# Patient Record
Sex: FEMALE | Race: WHITE | NOT HISPANIC OR LATINO | Employment: UNEMPLOYED | URBAN - METROPOLITAN AREA
[De-identification: names, ages, dates, MRNs, and addresses within clinical notes are randomized per-mention and may not be internally consistent; named-entity substitution may affect disease eponyms.]

---

## 2017-01-03 RX ORDER — HYDROXYZINE HYDROCHLORIDE 25 MG/1
25 TABLET, FILM COATED ORAL EVERY 6 HOURS PRN
Qty: 30 TABLET | Refills: 0 | Status: SHIPPED | OUTPATIENT
Start: 2017-01-03 | End: 2017-06-09 | Stop reason: SDUPTHER

## 2017-03-27 ENCOUNTER — TELEPHONE (OUTPATIENT)
Dept: INTERNAL MEDICINE | Facility: CLINIC | Age: 37
End: 2017-03-27

## 2017-03-27 ENCOUNTER — OFFICE VISIT (OUTPATIENT)
Dept: INTERNAL MEDICINE | Facility: CLINIC | Age: 37
End: 2017-03-27

## 2017-03-27 VITALS
TEMPERATURE: 99.8 F | WEIGHT: 157 LBS | HEART RATE: 88 BPM | SYSTOLIC BLOOD PRESSURE: 110 MMHG | BODY MASS INDEX: 26.95 KG/M2 | DIASTOLIC BLOOD PRESSURE: 64 MMHG | RESPIRATION RATE: 20 BRPM

## 2017-03-27 DIAGNOSIS — L02.415 ABSCESS OF LEG, RIGHT: Primary | ICD-10-CM

## 2017-03-27 PROCEDURE — 99213 OFFICE O/P EST LOW 20 MIN: CPT | Performed by: INTERNAL MEDICINE

## 2017-03-27 RX ORDER — VITAMIN A ACETATE, .BETA.-CAROTENE, ASCORBIC ACID, CHOLECALCIFEROL, .ALPHA.-TOCOPHEROL ACETATE, DL-, THIAMINE MONONITRATE, RIBOFLAVIN, NIACINAMIDE, PYRIDOXINE HYDROCHLORIDE, FOLIC ACID, CYANOCOBALAMIN, CALCIUM CARBONATE, FERROUS FUMARATE, ZINC OXIDE, AND CUPRIC OXIDE 2000; 2000; 120; 400; 22; 1.84; 3; 20; 10; 1; 12; 200; 27; 25; 2 [IU]/1; [IU]/1; MG/1; [IU]/1; MG/1; MG/1; MG/1; MG/1; MG/1; MG/1; UG/1; MG/1; MG/1; MG/1; MG/1
TABLET ORAL
COMMUNITY
Start: 2017-03-21 | End: 2018-06-06

## 2017-03-27 RX ORDER — AMOXICILLIN AND CLAVULANATE POTASSIUM 875; 125 MG/1; MG/1
1 TABLET, FILM COATED ORAL 2 TIMES DAILY
Qty: 20 TABLET | Refills: 0 | Status: SHIPPED | OUTPATIENT
Start: 2017-03-27 | End: 2017-04-06

## 2017-03-27 RX ORDER — SERTRALINE HYDROCHLORIDE 100 MG/1
100 TABLET, FILM COATED ORAL DAILY
COMMUNITY
End: 2018-05-28

## 2017-03-27 NOTE — TELEPHONE ENCOUNTER
Spoke with Lisa and told her to come at 4pm today for an appt.  Verb understanding given       Judy please schedule

## 2017-03-27 NOTE — PROGRESS NOTES
Subjective       Lisa Grover is a 36 y.o. female.     Chief Complaint   Patient presents with   • Insect Bite     Rt thigh        History obtained from the patient.      Insect Bite   This is a new problem. Episode onset: 2-3 days ago. The problem has been gradually worsening. Associated symptoms include arthralgias (shoulders), fatigue, nausea and neck pain. Pertinent negatives include no abdominal pain, chest pain, chills, congestion, coughing, fever, joint swelling, myalgias, rash, sore throat, swollen glands or vomiting. Treatments tried: alcohol and tetri oil, Neosporin, and warm compresses.      She is not sure if she was bit by an insect but she sits outside and smokes.  It is not itchy.  She states it drained yellow discharge 2 days ago.  The following portions of the patient's history were reviewed and updated as appropriate: allergies, current medications, past family history, past medical history, past social history, past surgical history and problem list.      Review of Systems   Constitutional: Positive for fatigue. Negative for chills, fever and unexpected weight change.   HENT: Negative for congestion and sore throat.    Respiratory: Negative for cough and shortness of breath.    Cardiovascular: Negative for chest pain.   Gastrointestinal: Positive for nausea. Negative for abdominal pain, diarrhea and vomiting.   Genitourinary: Negative for dysuria, frequency, hematuria and urgency.   Musculoskeletal: Positive for arthralgias (shoulders) and neck pain. Negative for joint swelling and myalgias.   Skin: Negative for rash.   Hematological: Negative for adenopathy.         Blood pressure 110/64, pulse 88, temperature 99.8 °F (37.7 °C), temperature source Temporal Artery , resp. rate 20, weight 157 lb (71.2 kg).      Objective     Physical Exam   Constitutional:   Overweight.   Skin:   Erythematous, warm, firm abscess right posterior upper thigh, tender to palpation, no drainage.   Psychiatric: She has  a normal mood and affect.   Nursing note and vitals reviewed.        Assessment/Plan   Lisa was seen today for insect bite.    Diagnoses and all orders for this visit:    Abscess of leg, right  -     amoxicillin-clavulanate (AUGMENTIN) 875-125 MG per tablet; Take 1 tablet by mouth 2 (Two) Times a Day for 10 days.    Recommend Sitz baths for 20 minutes, 2-3 times per day.    Return in about 3 days (around 3/30/2017).

## 2017-03-27 NOTE — TELEPHONE ENCOUNTER
----- Message from Ivett Spears sent at 3/27/2017  9:21 AM EDT -----  Contact: ANIKET BENNETT CALLING WITH A BOIL ON THE BACK OF HER LEG. SHE WANTS TO KNOW IF YOU COULD FIT HER IN TODAY TO MAKE SURE IT IS NOT A STAPH INFECTION. SHE SAID IT WAS DRAINING PUS ON Saturday AND IT HURTS A LITTLE TO WALK. SHE ALSO WANTS TO KNOW IF THIS COULD AFFECT HER BREAST MILK. SHE CAN BE REACHED [phone]

## 2017-03-29 ENCOUNTER — TELEPHONE (OUTPATIENT)
Dept: INTERNAL MEDICINE | Facility: CLINIC | Age: 37
End: 2017-03-29

## 2017-03-30 NOTE — TELEPHONE ENCOUNTER
----- Message from Day Ramirez sent at 3/29/2017  3:17 PM EDT -----  PT CALLED AND STATED THAT SHE WAS SEEN ON Monday FOR A SPOT ON HE LEG. SHE STATED THAT SHE IS GETTING ADMITTED TO THE [facility] FOR MRSA PER HER OB. SHE JUST WANTED TO LET DR WEST KNOW. SHE CAN BE REACHED [phone]

## 2017-05-17 ENCOUNTER — OFFICE VISIT (OUTPATIENT)
Dept: INTERNAL MEDICINE | Facility: CLINIC | Age: 37
End: 2017-05-17

## 2017-05-17 VITALS
RESPIRATION RATE: 16 BRPM | DIASTOLIC BLOOD PRESSURE: 70 MMHG | WEIGHT: 149 LBS | BODY MASS INDEX: 25.58 KG/M2 | HEART RATE: 84 BPM | SYSTOLIC BLOOD PRESSURE: 112 MMHG | TEMPERATURE: 98.8 F

## 2017-05-17 DIAGNOSIS — K62.5 RECTAL BLEEDING: Primary | ICD-10-CM

## 2017-05-17 LAB
ALBUMIN SERPL-MCNC: 4.2 G/DL (ref 3.2–4.8)
ALBUMIN/GLOB SERPL: 1.8 G/DL (ref 1.5–2.5)
ALP SERPL-CCNC: 62 U/L (ref 25–100)
ALT SERPL W P-5'-P-CCNC: 17 U/L (ref 7–40)
ANION GAP SERPL CALCULATED.3IONS-SCNC: 4 MMOL/L (ref 3–11)
AST SERPL-CCNC: 18 U/L (ref 0–33)
BASOPHILS # BLD AUTO: 0.03 10*3/MM3 (ref 0–0.2)
BASOPHILS NFR BLD AUTO: 0.4 % (ref 0–1)
BILIRUB SERPL-MCNC: 0.3 MG/DL (ref 0.3–1.2)
BUN BLD-MCNC: 13 MG/DL (ref 9–23)
BUN/CREAT SERPL: 18.6 (ref 7–25)
CALCIUM SPEC-SCNC: 9.6 MG/DL (ref 8.7–10.4)
CHLORIDE SERPL-SCNC: 108 MMOL/L (ref 99–109)
CO2 SERPL-SCNC: 29 MMOL/L (ref 20–31)
CREAT BLD-MCNC: 0.7 MG/DL (ref 0.6–1.3)
DEPRECATED RDW RBC AUTO: 48.3 FL (ref 37–54)
EOSINOPHIL # BLD AUTO: 0.23 10*3/MM3 (ref 0.1–0.3)
EOSINOPHIL NFR BLD AUTO: 3.1 % (ref 0–3)
ERYTHROCYTE [DISTWIDTH] IN BLOOD BY AUTOMATED COUNT: 14 % (ref 11.3–14.5)
ERYTHROCYTE [SEDIMENTATION RATE] IN BLOOD: 3 MM/HR (ref 0–20)
GFR SERPL CREATININE-BSD FRML MDRD: 96 ML/MIN/1.73
GLOBULIN UR ELPH-MCNC: 2.4 GM/DL
GLUCOSE BLD-MCNC: 82 MG/DL (ref 70–100)
HCT VFR BLD AUTO: 38.2 % (ref 34.5–44)
HGB BLD-MCNC: 12.1 G/DL (ref 11.5–15.5)
IMM GRANULOCYTES # BLD: 0.01 10*3/MM3 (ref 0–0.03)
IMM GRANULOCYTES NFR BLD: 0.1 % (ref 0–0.6)
LYMPHOCYTES # BLD AUTO: 2.84 10*3/MM3 (ref 0.6–4.8)
LYMPHOCYTES NFR BLD AUTO: 37.8 % (ref 24–44)
MCH RBC QN AUTO: 30 PG (ref 27–31)
MCHC RBC AUTO-ENTMCNC: 31.7 G/DL (ref 32–36)
MCV RBC AUTO: 94.8 FL (ref 80–99)
MONOCYTES # BLD AUTO: 0.67 10*3/MM3 (ref 0–1)
MONOCYTES NFR BLD AUTO: 8.9 % (ref 0–12)
NEUTROPHILS # BLD AUTO: 3.74 10*3/MM3 (ref 1.5–8.3)
NEUTROPHILS NFR BLD AUTO: 49.7 % (ref 41–71)
PLATELET # BLD AUTO: 258 10*3/MM3 (ref 150–450)
PMV BLD AUTO: 9.3 FL (ref 6–12)
POTASSIUM BLD-SCNC: 3.9 MMOL/L (ref 3.5–5.5)
PROT SERPL-MCNC: 6.6 G/DL (ref 5.7–8.2)
RBC # BLD AUTO: 4.03 10*6/MM3 (ref 3.89–5.14)
SODIUM BLD-SCNC: 141 MMOL/L (ref 132–146)
WBC NRBC COR # BLD: 7.52 10*3/MM3 (ref 3.5–10.8)

## 2017-05-17 PROCEDURE — 80053 COMPREHEN METABOLIC PANEL: CPT | Performed by: INTERNAL MEDICINE

## 2017-05-17 PROCEDURE — 85652 RBC SED RATE AUTOMATED: CPT | Performed by: INTERNAL MEDICINE

## 2017-05-17 PROCEDURE — 85025 COMPLETE CBC W/AUTO DIFF WBC: CPT | Performed by: INTERNAL MEDICINE

## 2017-05-17 PROCEDURE — 99214 OFFICE O/P EST MOD 30 MIN: CPT | Performed by: INTERNAL MEDICINE

## 2017-05-17 RX ORDER — VALACYCLOVIR HYDROCHLORIDE 1 G/1
1 TABLET, FILM COATED ORAL DAILY
COMMUNITY
Start: 2017-05-02 | End: 2018-06-06

## 2017-05-17 RX ORDER — ACETAMINOPHEN AND CODEINE PHOSPHATE 120; 12 MG/5ML; MG/5ML
0.35 SOLUTION ORAL DAILY
COMMUNITY
Start: 2017-05-02 | End: 2018-06-06

## 2017-05-17 RX ORDER — TERBINAFINE HYDROCHLORIDE 250 MG/1
250 TABLET ORAL DAILY
COMMUNITY
Start: 2017-04-04 | End: 2018-05-28

## 2017-06-09 ENCOUNTER — OFFICE VISIT (OUTPATIENT)
Dept: INTERNAL MEDICINE | Facility: CLINIC | Age: 37
End: 2017-06-09

## 2017-06-09 VITALS
WEIGHT: 145 LBS | HEART RATE: 76 BPM | BODY MASS INDEX: 24.89 KG/M2 | TEMPERATURE: 98.1 F | SYSTOLIC BLOOD PRESSURE: 106 MMHG | DIASTOLIC BLOOD PRESSURE: 68 MMHG | RESPIRATION RATE: 22 BRPM

## 2017-06-09 DIAGNOSIS — F41.1 GENERALIZED ANXIETY DISORDER: Primary | ICD-10-CM

## 2017-06-09 PROCEDURE — 99213 OFFICE O/P EST LOW 20 MIN: CPT | Performed by: INTERNAL MEDICINE

## 2017-06-09 RX ORDER — HYDROXYZINE HYDROCHLORIDE 25 MG/1
25 TABLET, FILM COATED ORAL EVERY 6 HOURS PRN
Qty: 30 TABLET | Refills: 2 | Status: SHIPPED | OUTPATIENT
Start: 2017-06-09 | End: 2020-09-17 | Stop reason: SDUPTHER

## 2017-06-09 NOTE — PROGRESS NOTES
Subjective       Lisa Grover is a 36 y.o. female.     Chief Complaint   Patient presents with   • Anxiety       History obtained from the patient.      History of Present Illness     The patient is seen for an ER follow-up.  She was seen at  ED on 6/8/17 for chest pain and a panic attack.   Records have been received and reviewed.      She was given Ativan.  EKG and  chest x-ray were normal. Mother states she has had increased stress and worsening anxiety over the past few weeks, including trouble with her live-in boyfriend.  Yesterday, her son became ill and she went into a full-blown panic attack.  She is on Zoloft 100 mg daily without side effects.  She takes Vistaril as needed.   She has a therapist and has an appointment today.  She would like a referral to a psychiatrist.  Of note, the patient denies alcohol use.  She states she has a sponsor and is attending meetings.        Current Outpatient Prescriptions on File Prior to Visit   Medication Sig Dispense Refill   • hydrOXYzine (ATARAX) 25 MG tablet Take 1 tablet by mouth Every 6 (Six) Hours As Needed for anxiety. PRN anxiety; For: Anxiety disorder, Depression 30 tablet 0   • ibuprofen (ADVIL,MOTRIN) 800 MG tablet Take 1 tablet by mouth every 6 (six) hours as needed for mild pain (1-3). (Patient taking differently: Take 600 mg by mouth Every 6 (Six) Hours As Needed for Mild Pain (1-3).) 30 tablet 0   • mupirocin (BACTROBAN) 2 % ointment      • norethindrone (MICRONOR) 0.35 MG tablet Take 0.35 mg by mouth Daily.     • Prenatal Vit-Fe Fumarate-FA (PNV PRENATAL PLUS MULTIVITAMIN) 27-1 MG tablet      • sertraline (ZOLOFT) 100 MG tablet Take 100 mg by mouth Daily.     • terbinafine (lamiSIL) 250 MG tablet Take 250 mg by mouth Daily.     • valACYclovir (VALTREX) 1000 MG tablet Take 1 g by mouth Daily.     • valACYclovir (VALTREX) 500 MG tablet Take 1,000 mg by mouth Daily.       No current facility-administered medications on file prior to visit.          The  following portions of the patient's history were reviewed and updated as appropriate: allergies, current medications, past family history, past medical history, past social history, past surgical history and problem list.    Review of Systems   Constitutional: Negative for chills and fever.   Psychiatric/Behavioral: Negative for sleep disturbance and suicidal ideas. The patient is nervous/anxious.         Denies depression.         Objective       Blood pressure 106/68, pulse 76, temperature 98.1 °F (36.7 °C), temperature source Temporal Artery , resp. rate 22, weight 145 lb (65.8 kg).      Physical Exam   Constitutional: She appears well-developed and well-nourished.   Psychiatric: She has a normal mood and affect.   Nursing note and vitals reviewed.    Counseling was given to patient for the following topics: appropriate exercise, discussed labs and diagnostic tests performed last visit or since last visit, importance of medication compliance, side effects of medications, stress increase in the patient's life, symptoms of anxiety and symptoms of depression . Total time of the encounter was 15 minutes and 15 minutes was spent counseling.      Assessment / Plan:    Lisa was seen today for anxiety.    Diagnoses and all orders for this visit:    Generalized anxiety disorder  -     hydrOXYzine (ATARAX) 25 MG tablet; Take 1 tablet by mouth Every 6 (Six) Hours As Needed for Anxiety. PRN anxiety; For: Anxiety disorder, Depression  -     Ambulatory Referral to Psychiatry      Return for Next scheduled follow up.

## 2018-05-28 PROCEDURE — 87086 URINE CULTURE/COLONY COUNT: CPT | Performed by: NURSE PRACTITIONER

## 2018-05-30 ENCOUNTER — TELEPHONE (OUTPATIENT)
Dept: URGENT CARE | Facility: CLINIC | Age: 38
End: 2018-05-30

## 2018-05-30 NOTE — TELEPHONE ENCOUNTER
[place]. [place] [facility] [place] that per MAJO Castorena her urine culture just came back normal mirna. Patient stated that she wasn't feeling any better, but hadn't been able to start the medication yet. I advised her to go ahead and start it and if her symptoms don't improve to follow up with her PCP. Patient verbalized understanding.

## 2018-06-06 ENCOUNTER — OFFICE VISIT (OUTPATIENT)
Dept: INTERNAL MEDICINE | Facility: CLINIC | Age: 38
End: 2018-06-06

## 2018-06-06 VITALS
TEMPERATURE: 97.1 F | BODY MASS INDEX: 24.04 KG/M2 | SYSTOLIC BLOOD PRESSURE: 110 MMHG | HEART RATE: 80 BPM | WEIGHT: 137.38 LBS | DIASTOLIC BLOOD PRESSURE: 80 MMHG | RESPIRATION RATE: 20 BRPM

## 2018-06-06 DIAGNOSIS — N39.0 URINARY TRACT INFECTION WITHOUT HEMATURIA, SITE UNSPECIFIED: ICD-10-CM

## 2018-06-06 DIAGNOSIS — H10.33 ACUTE CONJUNCTIVITIS OF BOTH EYES, UNSPECIFIED ACUTE CONJUNCTIVITIS TYPE: Primary | ICD-10-CM

## 2018-06-06 LAB
BILIRUB BLD-MCNC: NEGATIVE MG/DL
CLARITY, POC: CLEAR
COLOR UR: YELLOW
EXPIRATION DATE: NORMAL
GLUCOSE UR STRIP-MCNC: NEGATIVE MG/DL
KETONES UR QL: NEGATIVE
LEUKOCYTE EST, POC: NEGATIVE
Lab: NORMAL
NITRITE UR-MCNC: NEGATIVE MG/ML
PH UR: 7 [PH] (ref 5–8)
PROT UR STRIP-MCNC: NEGATIVE MG/DL
RBC # UR STRIP: NEGATIVE /UL
SP GR UR: 1.01 (ref 1–1.03)
UROBILINOGEN UR QL: NORMAL

## 2018-06-06 PROCEDURE — 99214 OFFICE O/P EST MOD 30 MIN: CPT | Performed by: INTERNAL MEDICINE

## 2018-06-06 RX ORDER — AMOXICILLIN AND CLAVULANATE POTASSIUM 875; 125 MG/1; MG/1
1 TABLET, FILM COATED ORAL 2 TIMES DAILY
Qty: 20 TABLET | Refills: 0 | Status: SHIPPED | OUTPATIENT
Start: 2018-06-06 | End: 2018-06-16

## 2018-06-06 RX ORDER — POLYMYXIN B SULFATE AND TRIMETHOPRIM 1; 10000 MG/ML; [USP'U]/ML
1 SOLUTION OPHTHALMIC EVERY 4 HOURS
COMMUNITY
End: 2019-09-05

## 2018-06-06 NOTE — PROGRESS NOTES
Subjective       Lisa Grover is a 37 y.o. female.     Chief Complaint   Patient presents with   • Eye Drainage     redness both eyes        History obtained from the patient.    The patient states she started binge drinking again last May.  She feels like she is going to have to go back into a rehabilitation center as an inpatient.  This is upsetting to her and she has spent the morning crying.      Conjunctivitis    Episode onset: 2-3 days ago. The onset was gradual. The problem has been gradually worsening. The problem is moderate. Nothing relieves the symptoms. The symptoms are aggravated by light. Associated symptoms include photophobia, congestion, headaches, sore throat, muscle aches, eye discharge (crusted in the morning), eye pain and eye redness. Pertinent negatives include no fever, no decreased vision, no double vision, no eye itching, no abdominal pain, no diarrhea, no nausea, no vomiting, no ear discharge, no ear pain, no rhinorrhea, no swollen glands, no neck pain, no neck stiffness, no cough, no wheezing and no rash. The eye pain is moderate. Both eyes are affected.The eye pain is not associated with movement. The eyelid exhibits redness. There were no sick contacts. She has received no recent medical care (using Polytrim drops received in the past, no relief).   Urinary Tract Infection    This is a new problem. Episode onset: 2 weeks ago. The problem occurs intermittently. The problem has been gradually improving. The quality of the pain is described as burning. There has been no fever. Associated symptoms include flank pain (bilateral) and frequency. Pertinent negatives include no chills, discharge, hematuria, nausea, urgency or vomiting. Treatments tried: Seen at [facility] several weeks ago for UTI.  She was started on Macrobid, and was told to continue with it, even when the urine culture came back negative.  She states she has not been taking it consistently.. The treatment provided mild relief. Her  past medical history is significant for recurrent UTIs. There is no history of kidney stones.      The patient states her last menstrual cycle was on 5/28/18.  She does have an appointment with her gynecologist on 6/20/18.    The following portions of the patient's history were reviewed and updated as appropriate: allergies, current medications, past family history, past medical history, past social history, past surgical history and problem list.      Review of Systems   Constitutional: Negative for chills, fatigue and fever.   HENT: Positive for congestion and sore throat. Negative for ear discharge, ear pain, postnasal drip, rhinorrhea, sinus pain and sinus pressure.    Eyes: Positive for photophobia, pain, discharge (crusted in the morning) and redness. Negative for double vision and itching.   Respiratory: Negative for cough and wheezing.    Gastrointestinal: Negative for abdominal pain, diarrhea, nausea and vomiting.   Genitourinary: Positive for dysuria, flank pain (bilateral) and frequency. Negative for hematuria, pelvic pain, urgency and vaginal discharge.   Musculoskeletal: Positive for myalgias. Negative for arthralgias, neck pain and neck stiffness.   Skin: Negative for rash.   Neurological: Positive for headaches.   Hematological: Negative for adenopathy.           Objective     Blood pressure 110/80, pulse 80, temperature 97.1 °F (36.2 °C), temperature source Temporal Artery , resp. rate 20, weight 62.3 kg (137 lb 6 oz), last menstrual period 05/28/2018.    Physical Exam   Constitutional: She appears well-developed and well-nourished.   HENT:   Head: Normocephalic and atraumatic.   Right Ear: Tympanic membrane, external ear and ear canal normal.   Left Ear: Tympanic membrane, external ear and ear canal normal.   Mouth/Throat: Oropharynx is clear and moist and mucous membranes are normal. No oral lesions.   Tonsils normal.  No sinus tenderness to palpation.   Eyes: Right eye exhibits no discharge. Left  eye exhibits no discharge. Right conjunctiva is injected. Left conjunctiva is injected.   Neck: Normal range of motion. Neck supple.   Cardiovascular: Normal rate and regular rhythm.    No murmur heard.  Pulmonary/Chest: Effort normal and breath sounds normal.   Lymphadenopathy:     She has no cervical adenopathy.   Skin: No rash noted.   Psychiatric: She has a normal mood and affect.   Nursing note and vitals reviewed.      Results for orders placed or performed in visit on 06/06/18   POC Urinalysis Dipstick, Automated   Result Value Ref Range    Color Yellow Yellow, Straw, Dark Yellow, Minerva    Clarity, UA Clear Clear    Specific Gravity  1.010 1.005 - 1.030    pH, Urine 7.0 5.0 - 8.0    Leukocytes Negative Negative    Nitrite, UA Negative Negative    Protein, POC Negative Negative mg/dL    Glucose, UA Negative Negative, 1000 mg/dL (3+) mg/dL    Ketones, UA Negative Negative    Urobilinogen, UA Normal Normal    Bilirubin Negative Negative    Blood, UA Negative Negative    Lot Number [id number]     Expiration Date 1-31-19        Assessment/Plan   Lisa was seen today for eye drainage.    Diagnoses and all orders for this visit:    Acute conjunctivitis of both eyes, unspecified acute conjunctivitis type  -     amoxicillin-clavulanate (AUGMENTIN) 875-125 MG per tablet; Take 1 tablet by mouth 2 (Two) Times a Day for 10 days.    Urinary tract infection without hematuria, site unspecified  -     POC Urinalysis Dipstick, Automated        Discontinue Macrobid and Polytrim drops.  Plenty of fluids.      Return if symptoms worsen or fail to improve.

## 2018-09-26 PROCEDURE — 87086 URINE CULTURE/COLONY COUNT: CPT | Performed by: NURSE PRACTITIONER

## 2018-09-30 ENCOUNTER — TELEPHONE (OUTPATIENT)
Dept: URGENT CARE | Facility: CLINIC | Age: 38
End: 2018-09-30

## 2018-09-30 NOTE — TELEPHONE ENCOUNTER
Spoke with Pt informed urine culture was normal. Pt stated symptoms were slightly improving. Informed Pt to finish antibiotic prescribed and if symptoms continue to follow-up with PCP or come back in to be seen again. Pt verbalized understanding no further questions.

## 2019-03-01 PROCEDURE — 87086 URINE CULTURE/COLONY COUNT: CPT | Performed by: NURSE PRACTITIONER

## 2019-03-03 ENCOUNTER — TELEPHONE (OUTPATIENT)
Dept: URGENT CARE | Facility: CLINIC | Age: 39
End: 2019-03-03

## 2019-05-25 PROCEDURE — 87088 URINE BACTERIA CULTURE: CPT | Performed by: NURSE PRACTITIONER

## 2019-05-25 PROCEDURE — 87086 URINE CULTURE/COLONY COUNT: CPT | Performed by: NURSE PRACTITIONER

## 2019-05-25 PROCEDURE — 87186 SC STD MICRODIL/AGAR DIL: CPT | Performed by: NURSE PRACTITIONER

## 2019-05-29 ENCOUNTER — TELEPHONE (OUTPATIENT)
Dept: URGENT CARE | Facility: CLINIC | Age: 39
End: 2019-05-29

## 2019-05-29 NOTE — TELEPHONE ENCOUNTER
Spoke with Pt informed urine culture grew bacteria on correct antibiotic for treatment if symptoms persists follow up with PCP. Pt verbalized understanding stated she was feeling better no further questions.

## 2019-09-05 ENCOUNTER — OFFICE VISIT (OUTPATIENT)
Dept: INTERNAL MEDICINE | Facility: CLINIC | Age: 39
End: 2019-09-05

## 2019-09-05 VITALS
RESPIRATION RATE: 18 BRPM | BODY MASS INDEX: 22.66 KG/M2 | TEMPERATURE: 98.2 F | HEART RATE: 88 BPM | DIASTOLIC BLOOD PRESSURE: 66 MMHG | WEIGHT: 131.13 LBS | SYSTOLIC BLOOD PRESSURE: 102 MMHG

## 2019-09-05 DIAGNOSIS — T14.8XXA BRUISING: ICD-10-CM

## 2019-09-05 DIAGNOSIS — Z23 NEED FOR TDAP VACCINATION: ICD-10-CM

## 2019-09-05 DIAGNOSIS — Z23 NEED FOR PNEUMOCOCCAL VACCINE: ICD-10-CM

## 2019-09-05 DIAGNOSIS — R53.83 FATIGUE, UNSPECIFIED TYPE: Primary | ICD-10-CM

## 2019-09-05 DIAGNOSIS — F41.1 GENERALIZED ANXIETY DISORDER: ICD-10-CM

## 2019-09-05 DIAGNOSIS — F32.A DEPRESSION, UNSPECIFIED DEPRESSION TYPE: ICD-10-CM

## 2019-09-05 LAB
25(OH)D3 SERPL-MCNC: 35.3 NG/ML (ref 30–100)
ALBUMIN SERPL-MCNC: 4.7 G/DL (ref 3.5–5.2)
ALBUMIN/GLOB SERPL: 2 G/DL
ALP SERPL-CCNC: 55 U/L (ref 39–117)
ALT SERPL W P-5'-P-CCNC: 11 U/L (ref 1–33)
ANION GAP SERPL CALCULATED.3IONS-SCNC: 14.4 MMOL/L (ref 5–15)
AST SERPL-CCNC: 18 U/L (ref 1–32)
BASOPHILS # BLD AUTO: 0.04 10*3/MM3 (ref 0–0.2)
BASOPHILS NFR BLD AUTO: 0.6 % (ref 0–1.5)
BILIRUB SERPL-MCNC: 0.2 MG/DL (ref 0.2–1.2)
BUN BLD-MCNC: 12 MG/DL (ref 6–20)
BUN/CREAT SERPL: 16.4 (ref 7–25)
CALCIUM SPEC-SCNC: 9.2 MG/DL (ref 8.6–10.5)
CHLORIDE SERPL-SCNC: 98 MMOL/L (ref 98–107)
CO2 SERPL-SCNC: 25.6 MMOL/L (ref 22–29)
CREAT BLD-MCNC: 0.73 MG/DL (ref 0.57–1)
DEPRECATED RDW RBC AUTO: 46.6 FL (ref 37–54)
EOSINOPHIL # BLD AUTO: 0.24 10*3/MM3 (ref 0–0.4)
EOSINOPHIL NFR BLD AUTO: 3.5 % (ref 0.3–6.2)
ERYTHROCYTE [DISTWIDTH] IN BLOOD BY AUTOMATED COUNT: 13.4 % (ref 12.3–15.4)
FOLATE SERPL-MCNC: >20 NG/ML (ref 4.78–24.2)
GFR SERPL CREATININE-BSD FRML MDRD: 90 ML/MIN/1.73
GLOBULIN UR ELPH-MCNC: 2.4 GM/DL
GLUCOSE BLD-MCNC: 82 MG/DL (ref 65–99)
HCT VFR BLD AUTO: 40.9 % (ref 34–46.6)
HGB BLD-MCNC: 12.9 G/DL (ref 12–15.9)
HIV1+2 AB SER QL: NORMAL
IMM GRANULOCYTES # BLD AUTO: 0.02 10*3/MM3 (ref 0–0.05)
IMM GRANULOCYTES NFR BLD AUTO: 0.3 % (ref 0–0.5)
LYMPHOCYTES # BLD AUTO: 2.45 10*3/MM3 (ref 0.7–3.1)
LYMPHOCYTES NFR BLD AUTO: 35.3 % (ref 19.6–45.3)
MCH RBC QN AUTO: 29.8 PG (ref 26.6–33)
MCHC RBC AUTO-ENTMCNC: 31.5 G/DL (ref 31.5–35.7)
MCV RBC AUTO: 94.5 FL (ref 79–97)
MONOCYTES # BLD AUTO: 0.46 10*3/MM3 (ref 0.1–0.9)
MONOCYTES NFR BLD AUTO: 6.6 % (ref 5–12)
NEUTROPHILS # BLD AUTO: 3.73 10*3/MM3 (ref 1.7–7)
NEUTROPHILS NFR BLD AUTO: 53.7 % (ref 42.7–76)
NRBC BLD AUTO-RTO: 0 /100 WBC (ref 0–0.2)
PLATELET # BLD AUTO: 264 10*3/MM3 (ref 140–450)
PMV BLD AUTO: 9.8 FL (ref 6–12)
POTASSIUM BLD-SCNC: 4.4 MMOL/L (ref 3.5–5.2)
PROT SERPL-MCNC: 7.1 G/DL (ref 6–8.5)
RBC # BLD AUTO: 4.33 10*6/MM3 (ref 3.77–5.28)
SODIUM BLD-SCNC: 138 MMOL/L (ref 136–145)
T4 FREE SERPL-MCNC: 0.93 NG/DL (ref 0.93–1.7)
TSH SERPL DL<=0.05 MIU/L-ACNC: 1.77 UIU/ML (ref 0.27–4.2)
VIT B12 BLD-MCNC: 345 PG/ML (ref 211–946)
WBC NRBC COR # BLD: 6.94 10*3/MM3 (ref 3.4–10.8)

## 2019-09-05 PROCEDURE — 99214 OFFICE O/P EST MOD 30 MIN: CPT | Performed by: INTERNAL MEDICINE

## 2019-09-05 PROCEDURE — 90732 PPSV23 VACC 2 YRS+ SUBQ/IM: CPT | Performed by: INTERNAL MEDICINE

## 2019-09-05 PROCEDURE — 90471 IMMUNIZATION ADMIN: CPT | Performed by: INTERNAL MEDICINE

## 2019-09-05 PROCEDURE — 82746 ASSAY OF FOLIC ACID SERUM: CPT | Performed by: INTERNAL MEDICINE

## 2019-09-05 PROCEDURE — 82607 VITAMIN B-12: CPT | Performed by: INTERNAL MEDICINE

## 2019-09-05 PROCEDURE — 82306 VITAMIN D 25 HYDROXY: CPT | Performed by: INTERNAL MEDICINE

## 2019-09-05 PROCEDURE — G0432 EIA HIV-1/HIV-2 SCREEN: HCPCS | Performed by: INTERNAL MEDICINE

## 2019-09-05 PROCEDURE — 90472 IMMUNIZATION ADMIN EACH ADD: CPT | Performed by: INTERNAL MEDICINE

## 2019-09-05 PROCEDURE — 84439 ASSAY OF FREE THYROXINE: CPT | Performed by: INTERNAL MEDICINE

## 2019-09-05 PROCEDURE — 90715 TDAP VACCINE 7 YRS/> IM: CPT | Performed by: INTERNAL MEDICINE

## 2019-09-05 PROCEDURE — 80050 GENERAL HEALTH PANEL: CPT | Performed by: INTERNAL MEDICINE

## 2019-09-05 RX ORDER — VALACYCLOVIR HYDROCHLORIDE 500 MG/1
500 TABLET, FILM COATED ORAL DAILY
Qty: 30 TABLET | Refills: 5 | Status: SHIPPED | OUTPATIENT
Start: 2019-09-05 | End: 2022-11-30 | Stop reason: ALTCHOICE

## 2019-09-05 RX ORDER — SULFAMETHOXAZOLE AND TRIMETHOPRIM 800; 160 MG/1; MG/1
1 TABLET ORAL 2 TIMES DAILY
COMMUNITY
End: 2019-10-03

## 2019-09-05 NOTE — PROGRESS NOTES
Subjective       Lisa Grover is a 38 y.o. female.     Chief Complaint   Patient presents with   • Depression   • Leg Injury     Left thigh bruise    • Oral Swelling     upper lip injury        History obtained from the patient.      History of Present Illness     The patient was last seen for general medical follow-up on 6/9/2017.    Patient has a history of Depression and Anxiety, currently followed by Psychiatrist and Counselor.  She has a history of alcohol and substance abuse.  She states she went back into rehab last December 2018.  She is done fairly well since then with a few lapses.  She denies recent drug or alcohol use.  She is currently off her Naltrexone and Trazodone.  She is still on Effexor XR, Lamictal, and Hydroxyzine.  She feels her depression is worse.  The patient states she wants to make sure nothing else is going on medically that is causing her symptoms.    The patient states she is currently on Bactrim for UTI.    The patient states she also fell through a deck last week.  She hit her upper lip and her left leg.  She also scraped her left leg on a nail on the deck.    Current Outpatient Medications on File Prior to Visit   Medication Sig Dispense Refill   • fluticasone (FLONASE) 50 MCG/ACT nasal spray 2 sprays into the nostril(s) as directed by provider Daily. 1 bottle 0   • hydrOXYzine (ATARAX) 25 MG tablet Take 1 tablet by mouth Every 6 (Six) Hours As Needed for Anxiety. PRN anxiety; For: Anxiety disorder, Depression (Patient taking differently: Take 10 mg by mouth As Needed for Anxiety. PRN anxiety; For: Anxiety disorder, Depression) 30 tablet 2   • LamoTRIgine (LAMICTAL PO) Take 25 mg by mouth Daily.     • propranolol (INDERAL) 10 MG tablet Take 10 mg by mouth As Needed.     • sulfamethoxazole-trimethoprim (BACTRIM DS) 800-160 MG per tablet Take 1 tablet by mouth 2 (Two) Times a Day.     • venlafaxine XR (EFFEXOR XR) 75 MG 24 hr capsule Take 150 mg by mouth Daily.       No current  facility-administered medications on file prior to visit.        Current outpatient and discharge medications have been reconciled for the patient.  Reviewed by: Reyna Genao MD        The following portions of the patient's history were reviewed and updated as appropriate: allergies, current medications, past family history, past medical history, past social history, past surgical history and problem list.    Review of Systems   Constitutional: Positive for unexpected weight change (down 6 pounds in 1 year). Negative for fatigue.   HENT: Negative for nosebleeds.    Eyes: Negative for visual disturbance.   Respiratory: Negative for cough, shortness of breath and wheezing.         Denies hemoptysis.   Cardiovascular: Negative for chest pain, palpitations and leg swelling.        No RAGSDALE, orthopnea, or claudication.   Gastrointestinal: Negative for abdominal pain, blood in stool, constipation, diarrhea, nausea and vomiting.        Denies melena.   Endocrine: Negative for cold intolerance, heat intolerance, polydipsia and polyuria.        Reports hair loss and dry skin.   Genitourinary: Negative for dysuria, frequency, hematuria and urgency.   Musculoskeletal: Negative for arthralgias and myalgias.        Has leg pain only to touch.   Neurological: Negative for dizziness, syncope, light-headedness, numbness and headaches.        No memory issues.  Denies tingling.   Hematological: Negative for adenopathy. Bruises/bleeds easily.   Psychiatric/Behavioral: Negative for confusion and decreased concentration. The patient is nervous/anxious.          Objective       Blood pressure 102/66, pulse 88, temperature 98.2 °F (36.8 °C), temperature source Temporal, resp. rate 18, weight 59.5 kg (131 lb 2 oz).      Physical Exam   Constitutional: She appears well-developed and well-nourished.   HENT:   Entire upper lip is red and swollen.   Neck: Normal range of motion. Neck supple. Carotid bruit is not present. No thyromegaly  present.   Cardiovascular: Normal rate, regular rhythm, normal heart sounds and intact distal pulses. Exam reveals no gallop and no friction rub.   No murmur heard.  No peripheral edema.   Pulmonary/Chest: Effort normal and breath sounds normal.   Abdominal: Soft. Bowel sounds are normal. She exhibits no distension, no abdominal bruit and no mass. There is no hepatosplenomegaly. There is no tenderness.   Musculoskeletal:   There is a large bruise with a healed vertical laceration on the anterior left thigh, which is tender to palpation.   Psychiatric: She has a normal mood and affect.   Nursing note and vitals reviewed.    Counseling was given to patient for the following topics: adverse effects of alcohol and recommended continued alcohol cessation, disease prevention, healthy eating habits, side effects of medications (psychiatric meds) stress increase in the patient's life, symptoms of anxiety, and symptoms of depression . Total time of the encounter was 20 minutes and 15 minutes was spent counseling.      Assessment / Plan:  Lisa was seen today for depression, leg injury and oral swelling.    Diagnoses and all orders for this visit:    Fatigue, unspecified type  -     Comprehensive Metabolic Panel  -     Vitamin B12  -     CBC & Differential  -     Vitamin D 25 Hydroxy  -     TSH  -     T4, Free  -     HIV-1 / O / 2 Ag / Antibody 4th Generation  -     Folate  -     CBC Auto Differential    Bruising    Depression, unspecified depression type   Continue current medication(s) as noted in the history of present illness.   Recommended the patient discuss any medication changes with Psychiatry.    Generalized anxiety disorder   Continue current medication(s) as noted in the history of present illness.   Recommended the patient discuss any medication changes with Psychiatry.    Need for pneumococcal vaccine  -     Pneumococcal Polysaccharide Vaccine 23-Valent Greater Than or Equal To 1yo Subcutaneous / IM    Need for  Tdap vaccination  -     Tdap Vaccine Greater Than or Equal To 8yo IM        Return in about 2 weeks (around 9/19/2019) for Annual physical, fasting.

## 2019-10-03 ENCOUNTER — OFFICE VISIT (OUTPATIENT)
Dept: INTERNAL MEDICINE | Facility: CLINIC | Age: 39
End: 2019-10-03

## 2019-10-03 VITALS
HEART RATE: 72 BPM | DIASTOLIC BLOOD PRESSURE: 80 MMHG | WEIGHT: 130.38 LBS | SYSTOLIC BLOOD PRESSURE: 110 MMHG | HEIGHT: 65 IN | TEMPERATURE: 97.5 F | BODY MASS INDEX: 21.72 KG/M2 | RESPIRATION RATE: 20 BRPM

## 2019-10-03 DIAGNOSIS — Z72.51 HIGH RISK HETEROSEXUAL BEHAVIOR: ICD-10-CM

## 2019-10-03 DIAGNOSIS — R35.0 URINARY FREQUENCY: ICD-10-CM

## 2019-10-03 DIAGNOSIS — F41.1 GENERALIZED ANXIETY DISORDER: ICD-10-CM

## 2019-10-03 DIAGNOSIS — Z00.00 ENCOUNTER FOR HEALTH MAINTENANCE EXAMINATION IN ADULT: Primary | ICD-10-CM

## 2019-10-03 DIAGNOSIS — Z23 NEED FOR IMMUNIZATION AGAINST INFLUENZA: ICD-10-CM

## 2019-10-03 DIAGNOSIS — F32.89 OTHER DEPRESSION: ICD-10-CM

## 2019-10-03 DIAGNOSIS — Z13.6 SCREENING FOR CARDIOVASCULAR CONDITION: ICD-10-CM

## 2019-10-03 DIAGNOSIS — R82.998 LEUKOCYTES IN URINE: ICD-10-CM

## 2019-10-03 DIAGNOSIS — E53.8 VITAMIN B12 DEFICIENCY: ICD-10-CM

## 2019-10-03 LAB
B-HCG UR QL: NEGATIVE
BILIRUB BLD-MCNC: ABNORMAL MG/DL
CHOLEST SERPL-MCNC: 136 MG/DL (ref 0–200)
CLARITY, POC: ABNORMAL
COLOR UR: YELLOW
EXPIRATION DATE: ABNORMAL
EXPIRATION DATE: NORMAL
GLUCOSE UR STRIP-MCNC: NEGATIVE MG/DL
HCV AB SER DONR QL: NORMAL
HDLC SERPL-MCNC: 62 MG/DL (ref 40–60)
INTERNAL NEGATIVE CONTROL: NEGATIVE
INTERNAL POSITIVE CONTROL: POSITIVE
KETONES UR QL: ABNORMAL
LDLC SERPL CALC-MCNC: 57 MG/DL (ref 0–100)
LDLC/HDLC SERPL: 0.92 {RATIO}
LEUKOCYTE EST, POC: ABNORMAL
Lab: ABNORMAL
Lab: NORMAL
NITRITE UR-MCNC: NEGATIVE MG/ML
PH UR: 5 [PH] (ref 5–8)
PROT UR STRIP-MCNC: NEGATIVE MG/DL
RBC # UR STRIP: NEGATIVE /UL
SP GR UR: 1.02 (ref 1–1.03)
TRIGL SERPL-MCNC: 84 MG/DL (ref 0–150)
UROBILINOGEN UR QL: ABNORMAL
VLDLC SERPL-MCNC: 16.8 MG/DL (ref 5–40)

## 2019-10-03 PROCEDURE — 87086 URINE CULTURE/COLONY COUNT: CPT | Performed by: INTERNAL MEDICINE

## 2019-10-03 PROCEDURE — 99395 PREV VISIT EST AGE 18-39: CPT | Performed by: INTERNAL MEDICINE

## 2019-10-03 PROCEDURE — 80061 LIPID PANEL: CPT | Performed by: INTERNAL MEDICINE

## 2019-10-03 PROCEDURE — 90471 IMMUNIZATION ADMIN: CPT | Performed by: INTERNAL MEDICINE

## 2019-10-03 PROCEDURE — 81025 URINE PREGNANCY TEST: CPT | Performed by: INTERNAL MEDICINE

## 2019-10-03 PROCEDURE — 86592 SYPHILIS TEST NON-TREP QUAL: CPT | Performed by: INTERNAL MEDICINE

## 2019-10-03 PROCEDURE — 90686 IIV4 VACC NO PRSV 0.5 ML IM: CPT | Performed by: INTERNAL MEDICINE

## 2019-10-03 PROCEDURE — 86803 HEPATITIS C AB TEST: CPT | Performed by: INTERNAL MEDICINE

## 2019-10-03 RX ORDER — PROPRANOLOL HYDROCHLORIDE 10 MG/1
10 TABLET ORAL DAILY PRN
Qty: 90 TABLET | Refills: 3 | Status: SHIPPED | OUTPATIENT
Start: 2019-10-03

## 2019-10-03 RX ORDER — PROPRANOLOL HYDROCHLORIDE 10 MG/1
10 TABLET ORAL DAILY PRN
Qty: 30 TABLET | Refills: 1 | Status: SHIPPED | OUTPATIENT
Start: 2019-10-03 | End: 2019-10-03 | Stop reason: SDUPTHER

## 2019-10-03 NOTE — PROGRESS NOTES
Subjective     Chief Complaint:  Physical Exam.    History of Present Illness    History obtained from the patient.    The patient was seen for Fatigue on 9/5/2019.  CMP, CBC, TSH, T4, and Folate were normal.  HIV was nonreactive.  Vitamin D was normal (35.3).  She started vitamin D supplements 1 week ago.  She has a history of Vitamin B12 Deficiency, and started vitamin B12 over-the-counter 1 week ago.  Vitamin B-12 level was normal for (345).  She states her fatigue is overall slightly better.    The patient has a history of GERD, stable without medications.    The patient has a history of Depression and Anxiety, followed by [facility].  She is on Effexor, Lamictal, Hydroxyzine, and Propranolol.  She had also been receiving counseling at [facility], where she was last admitted for alcohol rehabilitation, but that has concluded.  The patient states she was supposed to go to [place] with her family members on 9/14/2019.  She had a flare of her Depression at that time, and was unable to go.  She has a form she would like me to fill out to attempt to be reimbursed for her airplane ticket.    Patient is still smoking 1/2 pack of cigarettes per day.  She does not want to try Chantix due to fear of side effects.    Patient states she had unprotected intercourse twice this past weekend.  Otherwise, she has not been sexually active recently.  LMP was 9/14/2019- 9/15/2019.  She is over 37 and is a smoker.  She is not on any form of birth control.  She would like to discuss starting birth control today.      Lisa Grover is a 39 y.o. female who presents for an Annual Physical.      PMH, PSH, SocHx, FamHx, Allergies, and Medications: Reviewed and updated.    Outpatient Medications Prior to Visit   Medication Sig Dispense Refill   • hydrOXYzine (ATARAX) 25 MG tablet Take 1 tablet by mouth Every 6 (Six) Hours As Needed for Anxiety. PRN anxiety; For: Anxiety disorder, Depression (Patient taking  differently: Take 10 mg by mouth As Needed for Anxiety. PRN anxiety; For: Anxiety disorder, Depression) 30 tablet 2   • LamoTRIgine (LAMICTAL PO) Take 25 mg by mouth Daily.     • valACYclovir (VALTREX) 500 MG tablet Take 1 tablet by mouth Daily. 30 tablet 5   • venlafaxine XR (EFFEXOR XR) 75 MG 24 hr capsule Take 150 mg by mouth Daily.     • propranolol (INDERAL) 10 MG tablet Take 10 mg by mouth As Needed.     • fluticasone (FLONASE) 50 MCG/ACT nasal spray 2 sprays into the nostril(s) as directed by provider Daily. 1 bottle 0   • sulfamethoxazole-trimethoprim (BACTRIM DS) 800-160 MG per tablet Take 1 tablet by mouth 2 (Two) Times a Day.       No facility-administered medications prior to visit.        Immunization History   Administered Date(s) Administered   • Flu Mist 11/20/2015   • PPD Test 05/04/2015   • Pneumococcal Polysaccharide (PPSV23) 09/05/2019   • Tdap 09/05/2019         Patient Active Problem List   Diagnosis   • Alcoholism (CMS/HCC)   • Anxiety disorder   • Depression   • Gastroesophageal reflux disease   • Herpes simplex type 1 infection   • Substance abuse (CMS/HCC)   • Vitamin B12 deficiency       Health Habits:  Dental Exam. up to date  Eye Exam. up to date  Hearing Loss:  No  Exercise: 2 times/week.  Current exercise activities include: walking  Diet: Healthy  Multivitamin: Yes    Safe Driving:  Yes  Seat Belt:  Yes  Bike Helmet:  N/AYes  Skin Screening:  Yes  Sunscreen: Yes  SBE / GERARDO: Yes  Sexual Activity:  Yes  Birth Control:  None  STD Prevention:  None    Last Pap: January 2016, normal per patient report (GYN).  Last Mammogram:  N/A  Last DEXA Scan: N/A  Last Colonoscopy: N/A  Last PSA: N/A    Social:    Social History     Socioeconomic History   • Marital status: Single     Spouse name: Not on file   • Number of children: 1   • Years of education: Not on file   • Highest education level: Not on file   Occupational History   • Occupation: Unemployed   Tobacco Use   • Smoking status: Current  Every Day Smoker     Packs/day: 0.50     Years: 15.00     Pack years: 7.50     Start date: 2000   • Smokeless tobacco: Never Used   • Tobacco comment: 1/2  ppd 2000-present   Substance and Sexual Activity   • Alcohol use: No     Comment: 12/15/2018 stopped drinking  - sober living  --heavy use [id number], then 3-4 drinks per week.. Quit 12/15/19   • Drug use: No     Comment: h/o Cocaine use   • Sexual activity: Yes     Partners: Male     Birth control/protection: None         Current Medical Providers:    Reyna Genao MD (Internal Medicine / Pediatrics)    The [facility] providers who are involved in the care of this patient are listed above.         Review of Systems   Constitutional: Positive for fatigue. Negative for chills, fever and unexpected weight change (down 6 pounds since 6/6/18).        Has night sweats.    HENT: Negative for congestion, ear pain, hearing loss, nosebleeds, postnasal drip, rhinorrhea, sinus pressure, sinus pain, sneezing, sore throat, tinnitus and voice change.         Denies snoring.   Eyes: Negative for photophobia, pain, discharge, redness, itching and visual disturbance.   Respiratory: Negative for cough, chest tightness, shortness of breath, wheezing and stridor.         No chest congestion.  No hemoptysis.   Cardiovascular: Negative for chest pain, palpitations and leg swelling.        No orthopnea, RAGSDALE, or PND.  No claudication or syncope.   Gastrointestinal: Negative for abdominal pain, blood in stool, constipation, diarrhea, nausea, rectal pain and vomiting.        No melena.  No hematemesis.  No heartburn, dysphagia or odynophagia.  No early satiety, belching, or bloating.    Endocrine: Negative for cold intolerance, heat intolerance, polydipsia, polyphagia and polyuria.        No hair loss or dry skin.  No hot flashes.     Genitourinary: Positive for frequency. Negative for difficulty urinating, dyspareunia, dysuria, flank pain, hematuria, menstrual problem, pelvic pain,  "urgency, vaginal bleeding and vaginal discharge.        No nocturia, incomplete emptying, or incontinence.   Musculoskeletal: Positive for myalgias. Negative for arthralgias, back pain, gait problem, joint swelling, neck pain and neck stiffness.        No joint stiffness.   Skin: Negative for rash.        No new skin lesions or changes in skin lesions. No breast pain or masses.  No nipple discharge or nipple inversion.   Neurological: Negative for dizziness, tremors, syncope, speech difficulty, weakness, light-headedness, numbness and headaches.        No tingling.  No memory loss.  No decreased concentration.   Hematological: Negative for adenopathy. Does not bruise/bleed easily.   Psychiatric/Behavioral: Negative for confusion, sleep disturbance and suicidal ideas. The patient is nervous/anxious.         Stable depression.           Objective     Vitals:    10/03/19 0818   BP: 110/80   BP Location: Right arm   Pulse: 72   Resp: 20   Temp: 97.5 °F (36.4 °C)   TempSrc: Temporal   Weight: 59.1 kg (130 lb 6 oz)   Height: 163.8 cm (64.5\")       Body mass index is 22.03 kg/m².    Physical Exam   Constitutional: She appears well-developed and well-nourished.   HENT:   Head: Normocephalic and atraumatic.   Right Ear: Tympanic membrane, external ear and ear canal normal.   Left Ear: Tympanic membrane, external ear and ear canal normal.   Mouth/Throat: Oropharynx is clear and moist. No oral lesions.   Tonsils normal.   Eyes: Conjunctivae and EOM are normal. Pupils are equal, round, and reactive to light.   Neck: Normal range of motion. Neck supple. Carotid bruit is not present. No thyroid mass and no thyromegaly present.   Cardiovascular: Normal rate, regular rhythm, normal heart sounds and intact distal pulses. Exam reveals no gallop and no friction rub.   No murmur heard.  No peripheral edema.   Pulmonary/Chest: Effort normal and breath sounds normal. Right breast exhibits no inverted nipple, no mass, no nipple discharge, " no skin change and no tenderness. Left breast exhibits no inverted nipple, no mass, no nipple discharge, no skin change and no tenderness.   Abdominal: Soft. Bowel sounds are normal. She exhibits no distension, no abdominal bruit and no mass. There is no hepatosplenomegaly. There is no tenderness.   Genitourinary:   Genitourinary Comments:  and rectal exam deferred.   Musculoskeletal: Normal range of motion.   Lymphadenopathy:     She has no cervical adenopathy.     She has no axillary adenopathy. No inguinal adenopathy noted on the right or left side.        Right: No inguinal and no supraclavicular adenopathy present.        Left: No inguinal and no supraclavicular adenopathy present.   Neurological: She is alert. She has normal strength and normal reflexes. No cranial nerve deficit. Coordination and gait normal.   Skin: No lesion and no rash noted.   No atypical skin lesions.   Psychiatric: She has a normal mood and affect.   Nursing note and vitals reviewed.      PHQ-2 Depression Screening  Little interest or pleasure in doing things? 1   Feeling down, depressed, or hopeless? 0   PHQ-2 Total Score 1     Results for orders placed or performed in visit on 10/03/19   POC Urinalysis Dipstick, Automated   Result Value Ref Range    Color Yellow Yellow, Straw, Dark Yellow, Minerva    Clarity, UA Hazy (A) Clear    Specific Gravity  1.025 1.005 - 1.030    pH, Urine 5.0 5.0 - 8.0    Leukocytes 25 Dominga/ul (A) Negative    Nitrite, UA Negative Negative    Protein, POC Negative Negative mg/dL    Glucose, UA Negative Negative, 1000 mg/dL (3+) mg/dL    Ketones, UA 15 mg/dL (A) Negative    Urobilinogen, UA 1 E.U./dL  (A) Normal    Bilirubin 1 mg/dL (A) Negative    Blood, UA Negative Negative    Lot Number [id number]     Expiration Date 12-31-19    POC Pregnancy, Urine   Result Value Ref Range    HCG, Urine, QL Negative Negative    Lot Number [id number]     Internal Positive Control Positive     Internal Negative Control Negative      Expiration Date 12-31-20          Counseling was given to patient for the following topics:  appropriate exercise, healthy eating habits, disease prevention, risk factors for cancer, importance of self breast exam and breast health, importance of immunizations, including risks and benefits, birth control counseling including side effects, sun safety, seatbelt use, safe driving, safe sex, importance of smoking cessation, and discussed various options for quitting and risks of smoking (including electronic cigarettes) including cancer and death. Also discussed the importance of regular dental and vision care, as well recommendation for a yearly screening skin exam after age 40.  Written information provided to patient on these topics and other health maintenance issues.      Assessment/Plan       Lisa was seen today for annual exam.    Diagnoses and all orders for this visit:    Encounter for health maintenance examination in adult  -     Hepatitis C Antibody  -     Lipid Panel  -     RPR    Vitamin B12 deficiency   Okay to continue Vitamin  B12 supplementation    Other depression   Continue current medication(s) as noted in the history of present illness.    Generalized anxiety disorder  -     propranolol (INDERAL) 10 MG tablet; Take 1 tablet by mouth Daily As Needed (prn)- refill.   Continue current medication(s) as noted in the history of present illness.    Urinary frequency  -     POC Urinalysis Dipstick, Automated  -     POC Pregnancy, Urine  -     Urine Culture - Urine, Urine, Clean Catch    Leukocytes in urine  -     Urine Culture - Urine, Urine, Clean Catch    High risk heterosexual behavior  -     Hepatitis C Antibody  -     RPR    Screening for cardiovascular condition  -     Lipid Panel    Need for immunization against influenza  -     Fluarix/Fluzone/Afluria Quad>6 Months      Recommended  Hepatitis A vaccination at the pharmacy.        Return in about 1 year (around 10/3/2020) for Annual physical,  fasting.

## 2019-10-03 NOTE — PATIENT INSTRUCTIONS
I recommend Hepatitis A vaccination at the pharmacy.    Health Maintenance, Female  Adopting a healthy lifestyle and getting preventive care can go a long way to promote health and wellness. Talk with your health care provider about what schedule of regular examinations is right for you. This is a good chance for you to check in with your provider about disease prevention and staying healthy.  In between checkups, there are plenty of things you can do on your own. Experts have done a lot of research about which lifestyle changes and preventive measures are most likely to keep you healthy. Ask your health care provider for more information.  Weight and diet  Eat a healthy diet  · Be sure to include plenty of vegetables, fruits, low-fat dairy products, and lean protein.  · Do not eat a lot of foods high in solid fats, added sugars, or salt.  · Get regular exercise. This is one of the most important things you can do for your health.  ? Most adults should exercise for at least 150 minutes each week. The exercise should increase your heart rate and make you sweat (moderate-intensity exercise).  ? Most adults should also do strengthening exercises at least twice a week. This is in addition to the moderate-intensity exercise.  Maintain a healthy weight  · Body mass index (BMI) is a measurement that can be used to identify possible weight problems. It estimates body fat based on height and weight. Your health care provider can help determine your BMI and help you achieve or maintain a healthy weight.  · For females 20 years of age and older:  ? A BMI below 18.5 is considered underweight.  ? A BMI of 18.5 to 24.9 is normal.  ? A BMI of 25 to 29.9 is considered overweight.  ? A BMI of 30 and above is considered obese.  Watch levels of cholesterol and blood lipids  · You should start having your blood tested for lipids and cholesterol at 20 years of age, then have this test every 5 years.  · You may need to have your  cholesterol levels checked more often if:  ? Your lipid or cholesterol levels are high.  ? You are older than 50 years of age.  ? You are at high risk for heart disease.  Cancer screening  Lung Cancer  · Lung cancer screening is recommended for adults 55-80 years old who are at high risk for lung cancer because of a history of smoking.  · A yearly low-dose CT scan of the lungs is recommended for people who:  ? Currently smoke.  ? Have quit within the past 15 years.  ? Have at least a 30-pack-year history of smoking. A pack year is smoking an average of one pack of cigarettes a day for 1 year.  · Yearly screening should continue until it has been 15 years since you quit.  · Yearly screening should stop if you develop a health problem that would prevent you from having lung cancer treatment.  Breast Cancer  · Practice breast self-awareness. This means understanding how your breasts normally appear and feel.  · It also means doing regular breast self-exams. Let your health care provider know about any changes, no matter how small.  · If you are in your 20s or 30s, you should have a clinical breast exam (CBE) by a health care provider every 1-3 years as part of a regular health exam.  · If you are 40 or older, have a CBE every year. Also consider having a breast X-ray (mammogram) every year.  · If you have a family history of breast cancer, talk to your health care provider about genetic screening.  · If you are at high risk for breast cancer, talk to your health care provider about having an MRI and a mammogram every year.  · Breast cancer gene (BRCA) assessment is recommended for women who have family members with BRCA-related cancers. BRCA-related cancers include:  ? Breast.  ? Ovarian.  ? Tubal.  ? Peritoneal cancers.  · Results of the assessment will determine the need for genetic counseling and BRCA1 and BRCA2 testing.  Cervical Cancer  Your health care provider may recommend that you be screened regularly for  cancer of the pelvic organs (ovaries, uterus, and vagina). This screening involves a pelvic examination, including checking for microscopic changes to the surface of your cervix (Pap test). You may be encouraged to have this screening done every 3 years, beginning at age 21.  · For women ages 30-65, health care providers may recommend pelvic exams and Pap testing every 3 years, or they may recommend the Pap and pelvic exam, combined with testing for human papilloma virus (HPV), every 5 years. Some types of HPV increase your risk of cervical cancer. Testing for HPV may also be done on women of any age with unclear Pap test results.  · Other health care providers may not recommend any screening for nonpregnant women who are considered low risk for pelvic cancer and who do not have symptoms. Ask your health care provider if a screening pelvic exam is right for you.  · If you have had past treatment for cervical cancer or a condition that could lead to cancer, you need Pap tests and screening for cancer for at least 20 years after your treatment. If Pap tests have been discontinued, your risk factors (such as having a new sexual partner) need to be reassessed to determine if screening should resume. Some women have medical problems that increase the chance of getting cervical cancer. In these cases, your health care provider may recommend more frequent screening and Pap tests.  Colorectal Cancer  · This type of cancer can be detected and often prevented.  · Routine colorectal cancer screening usually begins at 50 years of age and continues through 75 years of age.  · Your health care provider may recommend screening at an earlier age if you have risk factors for colon cancer.  · Your health care provider may also recommend using home test kits to check for hidden blood in the stool.  · A small camera at the end of a tube can be used to examine your colon directly (sigmoidoscopy or colonoscopy). This is done to check for  the earliest forms of colorectal cancer.  · Routine screening usually begins at age 50.  · Direct examination of the colon should be repeated every 5-10 years through 75 years of age. However, you may need to be screened more often if early forms of precancerous polyps or small growths are found.  Skin Cancer  · Check your skin from head to toe regularly.  · Tell your health care provider about any new moles or changes in moles, especially if there is a change in a mole's shape or color.  · Also tell your health care provider if you have a mole that is larger than the size of a pencil eraser.  · Always use sunscreen. Apply sunscreen liberally and repeatedly throughout the day.  · Protect yourself by wearing long sleeves, pants, a wide-brimmed hat, and sunglasses whenever you are outside.  Heart disease, diabetes, and high blood pressure  · High blood pressure causes heart disease and increases the risk of stroke. High blood pressure is more likely to develop in:  ? People who have blood pressure in the high end of the normal range (130-139/85-89 mm Hg).  ? People who are overweight or obese.  ? People who are .  · If you are 18-39 years of age, have your blood pressure checked every 3-5 years. If you are 40 years of age or older, have your blood pressure checked every year. You should have your blood pressure measured twice--once when you are at a hospital or clinic, and once when you are not at a hospital or clinic. Record the average of the two measurements. To check your blood pressure when you are not at a hospital or clinic, you can use:  ? An automated blood pressure machine at a pharmacy.  ? A home blood pressure monitor.  · If you are between 55 years and 79 years old, ask your health care provider if you should take aspirin to prevent strokes.  · Have regular diabetes screenings. This involves taking a blood sample to check your fasting blood sugar level.  ? If you are at a normal weight and  have a low risk for diabetes, have this test once every three years after 45 years of age.  ? If you are overweight and have a high risk for diabetes, consider being tested at a younger age or more often.  Preventing infection  Hepatitis B  · If you have a higher risk for hepatitis B, you should be screened for this virus. You are considered at high risk for hepatitis B if:  ? You were born in a country where hepatitis B is common. Ask your health care provider which countries are considered high risk.  ? Your parents were born in a high-risk country, and you have not been immunized against hepatitis B (hepatitis B vaccine).  ? You have HIV or AIDS.  ? You use needles to inject street drugs.  ? You live with someone who has hepatitis B.  ? You have had sex with someone who has hepatitis B.  ? You get hemodialysis treatment.  ? You take certain medicines for conditions, including cancer, organ transplantation, and autoimmune conditions.  Hepatitis C  · Blood testing is recommended for:  ? Everyone born from 1945 through 1965.  ? Anyone with known risk factors for hepatitis C.  Sexually transmitted infections (STIs)  · You should be screened for sexually transmitted infections (STIs) including gonorrhea and chlamydia if:  ? You are sexually active and are younger than 24 years of age.  ? You are older than 24 years of age and your health care provider tells you that you are at risk for this type of infection.  ? Your sexual activity has changed since you were last screened and you are at an increased risk for chlamydia or gonorrhea. Ask your health care provider if you are at risk.  · If you do not have HIV, but are at risk, it may be recommended that you take a prescription medicine daily to prevent HIV infection. This is called pre-exposure prophylaxis (PrEP). You are considered at risk if:  ? You are sexually active and do not regularly use condoms or know the HIV status of your partner(s).  ? You take drugs by  injection.  ? You are sexually active with a partner who has HIV.  Talk with your health care provider about whether you are at high risk of being infected with HIV. If you choose to begin PrEP, you should first be tested for HIV. You should then be tested every 3 months for as long as you are taking PrEP.  Pregnancy  · If you are premenopausal and you may become pregnant, ask your health care provider about preconception counseling.  · If you may become pregnant, take 400 to 800 micrograms (mcg) of folic acid every day.  · If you want to prevent pregnancy, talk to your health care provider about birth control (contraception).  Osteoporosis and menopause  · Osteoporosis is a disease in which the bones lose minerals and strength with aging. This can result in serious bone fractures. Your risk for osteoporosis can be identified using a bone density scan.  · If you are 65 years of age or older, or if you are at risk for osteoporosis and fractures, ask your health care provider if you should be screened.  · Ask your health care provider whether you should take a calcium or vitamin D supplement to lower your risk for osteoporosis.  · Menopause may have certain physical symptoms and risks.  · Hormone replacement therapy may reduce some of these symptoms and risks.  Talk to your health care provider about whether hormone replacement therapy is right for you.  Follow these instructions at home:  · Schedule regular health, dental, and eye exams.  · Stay current with your immunizations.  · Do not use any tobacco products including cigarettes, chewing tobacco, or electronic cigarettes.  · If you are pregnant, do not drink alcohol.  · If you are breastfeeding, limit how much and how often you drink alcohol.  · Limit alcohol intake to no more than 1 drink per day for nonpregnant women. One drink equals 12 ounces of beer, 5 ounces of wine, or 1½ ounces of hard liquor.  · Do not use street drugs.  · Do not share needles.  · Ask  your health care provider for help if you need support or information about quitting drugs.  · Tell your health care provider if you often feel depressed.  · Tell your health care provider if you have ever been abused or do not feel safe at home.  This information is not intended to replace advice given to you by your health care provider. Make sure you discuss any questions you have with your health care provider.  Document Released: 07/02/2012 Document Revised: 05/25/2017 Document Reviewed: 09/20/2016  [employer] Interactive Patient Education © 2019 [employer]        Safe Sex  Practicing safe sex means taking steps before and during sex to reduce your risk of:  · Getting an STD (sexually transmitted disease).  · Giving your partner an STD.  · Unwanted pregnancy.  How can I practice safe sex?  To practice safe sex:  · Limit your sexual partners to only one partner who is having sex with only you.  · Avoid using alcohol and recreational drugs before having sex. These substances can affect your judgment.  · Before having sex with a new partner:  ? Talk to your partner about past partners, past STDs, and drug use.  ? You and your partner should be screened for STDs and discuss the results with each other.  · Check your body regularly for sores, blisters, rashes, or unusual discharge. If you notice any of these problems, visit your health care provider.  · If you have symptoms of an infection or you are being treated for an STD, avoid sexual contact.  · While having sex, use a condom. Make sure to:  ? Use a condom every time you have vaginal, oral, or anal sex. Both females and males should wear condoms during oral sex.  ? Keep condoms in place from the beginning to the end of sexual activity.  ? Use a latex condom, if possible. Latex condoms offer the best protection.  ? Use only water-based lubricants or oils to lubricate a condom. Using petroleum-based lubricants or oils will weaken the condom and increase the  chance that it will break.  · See your health care provider for regular screenings, exams, and tests for STDs.  · Talk with your health care provider about the form of birth control (contraception) that is best for you.  · Get vaccinated against hepatitis B and human papillomavirus (HPV).  · If you are at risk of being infected with HIV (human immunodeficiency virus), talk with your health care provider about taking a prescription medicine to prevent HIV infection. You are considered at risk for HIV if:  ? You are a man who has sex with other men.  ? You are a heterosexual man or woman who is sexually active with more than one partner.  ? You take drugs by injection.  ? You are sexually active with a partner who has HIV.  This information is not intended to replace advice given to you by your health care provider. Make sure you discuss any questions you have with your health care provider.  Document Released: 01/25/2006 Document Revised: 05/03/2017 Document Reviewed: 11/06/2016  [employer] Interactive Patient Education © 2019 [employer]      Safe Sex  Practicing safe sex means taking steps before and during sex to reduce your risk of:  · Getting an STD (sexually transmitted disease).  · Giving your partner an STD.  · Unwanted pregnancy.  How can I practice safe sex?  To practice safe sex:  · Limit your sexual partners to only one partner who is having sex with only you.  · Avoid using alcohol and recreational drugs before having sex. These substances can affect your judgment.  · Before having sex with a new partner:  ? Talk to your partner about past partners, past STDs, and drug use.  ? You and your partner should be screened for STDs and discuss the results with each other.  · Check your body regularly for sores, blisters, rashes, or unusual discharge. If you notice any of these problems, visit your health care provider.  · If you have symptoms of an infection or you are being treated for an STD, avoid sexual  contact.  · While having sex, use a condom. Make sure to:  ? Use a condom every time you have vaginal, oral, or anal sex. Both females and males should wear condoms during oral sex.  ? Keep condoms in place from the beginning to the end of sexual activity.  ? Use a latex condom, if possible. Latex condoms offer the best protection.  ? Use only water-based lubricants or oils to lubricate a condom. Using petroleum-based lubricants or oils will weaken the condom and increase the chance that it will break.  · See your health care provider for regular screenings, exams, and tests for STDs.  · Talk with your health care provider about the form of birth control (contraception) that is best for you.  · Get vaccinated against hepatitis B and human papillomavirus (HPV).  · If you are at risk of being infected with HIV (human immunodeficiency virus), talk with your health care provider about taking a prescription medicine to prevent HIV infection. You are considered at risk for HIV if:  ? You are a man who has sex with other men.  ? You are a heterosexual man or woman who is sexually active with more than one partner.  ? You take drugs by injection.  ? You are sexually active with a partner who has HIV.  This information is not intended to replace advice given to you by your health care provider. Make sure you discuss any questions you have with your health care provider.  Document Released: 01/25/2006 Document Revised: 05/03/2017 Document Reviewed: 11/06/2016  [employer] Interactive Patient Education © 2019 [employer]      Tobacco Use Disorder  Tobacco use disorder (TUD) occurs when a person craves, seeks, and uses tobacco, regardless of the consequences. This disorder can cause problems with mental and physical health. It can affect your ability to have healthy relationships, and it can keep you from meeting your responsibilities at work, home, or school.  Tobacco may be:  · Smoked as a cigarette or cigar.  · Inhaled using  e-cigarettes.  · Smoked in a pipe or hookah.  · Chewed as smokeless tobacco.  · Inhaled into the nostrils as snuff.  Tobacco products contain a dangerous chemical called nicotine, which is very addictive. Nicotine triggers hormones that make the body feel stimulated and works on areas of the brain that make you feel good. These effects can make it hard for people to quit nicotine.  Tobacco contains many other unsafe chemicals that can damage almost every organ in the body. Smoking tobacco also puts others in danger due to fire risk and possible health problems caused by breathing in secondhand smoke.  What are the signs or symptoms?  Symptoms of TUD may include:  · Being unable to slow down or stop your tobacco use.  · Spending an abnormal amount of time getting or using tobacco.  · Craving tobacco. Cravings may last for up to 6 months after quitting.  · Tobacco use that:  ? Interferes with your work, school, or home life.  ? Interferes with your personal and social relationships.  ? Makes you give up activities that you once enjoyed or found important.  · Using tobacco even though you know that it is:  ? Dangerous or bad for your health or someone else's health.  ? Causing problems in your life.  · Needing more and more of the substance to get the same effect (developing tolerance).  · Experiencing unpleasant symptoms if you do not use the substance (withdrawal). Withdrawal symptoms may include:  ? Depressed, anxious, or irritable mood.  ? Difficulty concentrating.  ? Increased appetite.  ? Restlessness or trouble sleeping.  · Using the substance to avoid withdrawal.  How is this diagnosed?  This condition may be diagnosed based on:  · Your current and past tobacco use. Your health care provider may ask questions about how your tobacco use affects your life.  · A physical exam.  You may be diagnosed with TUD if you have at least two symptoms within a 12-month period.  How is this treated?  This condition is treated  by stopping tobacco use. Many people are unable to quit on their own and need help. Treatment may include:  · Nicotine replacement therapy (NRT). NRT provides nicotine without the other harmful chemicals in tobacco. NRT gradually lowers the dosage of nicotine in the body and reduces withdrawal symptoms. NRT is available as:  ? Over-the-counter gums, lozenges, and skin patches.  ? Prescription mouth inhalers and nasal sprays.  · Medicine that acts on the brain to reduce cravings and withdrawal symptoms.  · A type of talk therapy that examines your triggers for tobacco use, how to avoid them, and how to cope with cravings (behavioral therapy).  · Hypnosis. This may help with withdrawal symptoms.  · Joining a support group for others coping with TUD.  The best treatment for TUD is usually a combination of medicine, talk therapy, and support groups. Recovery can be a long process. Many people start using tobacco again after stopping (relapse). If you relapse, it does not mean that treatment will not work.  Follow these instructions at home:    Lifestyle  · Do not use any products that contain nicotine or tobacco, such as cigarettes and e-cigarettes.  · Avoid things that trigger tobacco use as much as you can. Triggers include people and situations that usually cause you to use tobacco.  · Avoid drinks that contain caffeine, including coffee. These may worsen some withdrawal symptoms.  · Find ways to manage stress. Wanting to smoke may cause stress, and stress can make you want to smoke. Relaxation techniques such as deep breathing, meditation, and yoga may help.  · Attend support groups as needed. These groups are an important part of long-term recovery for many people.  General instructions  · Take over-the-counter and prescription medicines only as told by your health care provider.  · Check with your health care provider before taking any new prescription or over-the-counter medicines.  · Decide on a friend, family  member, or smoking quit-line (such as 1-800-QUIT-NOW in the [place] that you can call or text when you feel the urge to smoke or when you need help coping with cravings.  · Keep all follow-up visits as told by your health care provider and therapist. This is important.  Contact a health care provider if:  · You are not able to take your medicines as prescribed.  · Your symptoms get worse, even with treatment.  Summary  · Tobacco use disorder (TUD) occurs when a person craves, seeks, and uses tobacco regardless of the consequences.  · This condition may be diagnosed based on your current and past tobacco use and a physical exam.  · Many people are unable to quit on their own and need help. Recovery can be a long process.  · The most effective treatment for TUD is usually a combination of medicine, talk therapy, and support groups.  This information is not intended to replace advice given to you by your health care provider. Make sure you discuss any questions you have with your health care provider.  Document Released: 08/23/2005 Document Revised: 12/05/2018 Document Reviewed: 12/05/2018  [employer] Interactive Patient Education © 2019 [employer]      For more information:    Quit Now [place]  1-800-QUIT-NOW  [email or web address]://[email or web address].org/en-[place]/  Steps to Quit Smoking  Smoking tobacco can be harmful to your health and can affect almost every organ in your body. Smoking puts you, and those around you, at risk for developing many serious chronic diseases. Quitting smoking is difficult, but it is one of the best things that you can do for your health. It is never too late to quit.  What are the benefits of quitting smoking?  When you quit smoking, you lower your risk of developing serious diseases and conditions, such as:  · Lung cancer or lung disease, such as COPD.  · Heart disease.  · Stroke.  · Heart attack.  · Infertility.  · Osteoporosis and bone fractures.  Additionally, symptoms such as coughing, wheezing, and  shortness of breath may get better when you quit. You may also find that you get sick less often because your body is stronger at fighting off colds and infections. If you are pregnant, quitting smoking can help to reduce your chances of having a baby of low birth weight.  How do I get ready to quit?  When you decide to quit smoking, create a plan to make sure that you are successful. Before you quit:  · Pick a date to quit. Set a date within the next two weeks to give you time to prepare.  · Write down the reasons why you are quitting. Keep this list in places where you will see it often, such as on your bathroom mirror or in your car or wallet.  · Identify the people, places, things, and activities that make you want to smoke (triggers) and avoid them. Make sure to take these actions:  ¨ Throw away all cigarettes at home, at work, and in your car.  ¨ Throw away smoking accessories, such as ashtrays and lighters.  ¨ Clean your car and make sure to empty the ashtray.  ¨ Clean your home, including curtains and carpets.  · Tell your family, friends, and coworkers that you are quitting. Support from your loved ones can make quitting easier.  · Talk with your health care provider about your options for quitting smoking.  · Find out what treatment options are covered by your health insurance.  What strategies can I use to quit smoking?  Talk with your healthcare provider about different strategies to quit smoking. Some strategies include:  · Quitting smoking altogether instead of gradually lessening how much you smoke over a period of time. Research shows that quitting “cold turkey” is more successful than gradually quitting.  · Attending in-person counseling to help you build problem-solving skills. You are more likely to have success in quitting if you attend several counseling sessions. Even short sessions of 10 minutes can be effective.  · Finding resources and support systems that can help you to quit smoking and  remain smoke-free after you quit. These resources are most helpful when you use them often. They can include:  ¨ Online chats with a counselor.  ¨ Telephone quitlines.  ¨ Printed self-help materials.  ¨ Support groups or group counseling.  ¨ Text messaging programs.  ¨ Mobile phone applications.  · Taking medicines to help you quit smoking. (If you are pregnant or breastfeeding, talk with your health care provider first.) Some medicines contain nicotine and some do not. Both types of medicines help with cravings, but the medicines that include nicotine help to relieve withdrawal symptoms. Your health care provider may recommend:  ¨ Nicotine patches, gum, or lozenges.  ¨ Nicotine inhalers or sprays.  ¨ Non-nicotine medicine that is taken by mouth.  Talk with your health care provider about combining strategies, such as taking medicines while you are also receiving in-person counseling. Using these two strategies together makes you more likely to succeed in quitting than if you used either strategy on its own.  If you are pregnant or breastfeeding, talk with your health care provider about finding counseling or other support strategies to quit smoking. Do not take medicine to help you quit smoking unless told to do so by your health care provider.  What things can I do to make it easier to quit?  Quitting smoking might feel overwhelming at first, but there is a lot that you can do to make it easier. Take these important actions:  · Reach out to your family and friends and ask that they support and encourage you during this time. Call telephone quitlines, reach out to support groups, or work with a counselor for support.  · Ask people who smoke to avoid smoking around you.  · Avoid places that trigger you to smoke, such as bars, parties, or smoke-break areas at work.  · Spend time around people who do not smoke.  · Lessen stress in your life, because stress can be a smoking trigger for some people. To lessen stress,  try:  ¨ Exercising regularly.  ¨ Deep-breathing exercises.  ¨ Yoga.  ¨ Meditating.  ¨ Performing a body scan. This involves closing your eyes, scanning your body from head to toe, and noticing which parts of your body are particularly tense. Purposefully relax the muscles in those areas.  · Download or purchase mobile phone or tablet apps (applications) that can help you stick to your quit plan by providing reminders, tips, and encouragement. There are many free apps, such as QuitGuide from the CDC (Centers for Disease Control and Prevention). You can find other support for quitting smoking (smoking cessation) through smokefree.gov and other websites.  How will I feel when I quit smoking?  Within the first 24 hours of quitting smoking, you may start to feel some withdrawal symptoms. These symptoms are usually most noticeable 2-3 days after quitting, but they usually do not last beyond 2-3 weeks. Changes or symptoms that you might experience include:  · Mood swings.  · Restlessness, anxiety, or irritation.  · Difficulty concentrating.  · Dizziness.  · Strong cravings for sugary foods in addition to nicotine.  · Mild weight gain.  · Constipation.  · Nausea.  · Coughing or a sore throat.  · Changes in how your medicines work in your body.  · A depressed mood.  · Difficulty sleeping (insomnia).  After the first 2-3 weeks of quitting, you may start to notice more positive results, such as:  · Improved sense of smell and taste.  · Decreased coughing and sore throat.  · Slower heart rate.  · Lower blood pressure.  · Clearer skin.  · The ability to breathe more easily.  · Fewer sick days.  Quitting smoking is very challenging for most people. Do not get discouraged if you are not successful the first time. Some people need to make many attempts to quit before they achieve long-term success. Do your best to stick to your quit plan, and talk with your health care provider if you have any questions or concerns.  This  information is not intended to replace advice given to you by your health care provider. Make sure you discuss any questions you have with your health care provider.  Document Released: 12/12/2002 Document Revised: 08/15/2017 Document Reviewed: 05/03/2016  [employer] Interactive Patient Education © 2017 [employer].       stretcher

## 2019-10-04 LAB — BACTERIA SPEC AEROBE CULT: NO GROWTH

## 2019-10-05 LAB — RPR SER QL: NORMAL

## 2019-10-07 ENCOUNTER — TELEPHONE (OUTPATIENT)
Dept: INTERNAL MEDICINE | Facility: CLINIC | Age: 39
End: 2019-10-07

## 2019-10-07 NOTE — TELEPHONE ENCOUNTER
Patient's airline insurance form is done.  She previously indicated she would like to pick this up.

## 2019-10-28 ENCOUNTER — TELEPHONE (OUTPATIENT)
Dept: PEDIATRICS | Facility: OTHER | Age: 39
End: 2019-10-28

## 2019-10-28 RX ORDER — VENLAFAXINE HYDROCHLORIDE 75 MG/1
150 CAPSULE, EXTENDED RELEASE ORAL DAILY
Status: CANCELLED | OUTPATIENT
Start: 2019-10-28

## 2019-10-29 ENCOUNTER — TELEPHONE (OUTPATIENT)
Dept: INTERNAL MEDICINE | Facility: CLINIC | Age: 39
End: 2019-10-29

## 2019-10-29 RX ORDER — VENLAFAXINE HYDROCHLORIDE 75 MG/1
150 CAPSULE, EXTENDED RELEASE ORAL DAILY
Qty: 30 CAPSULE | Refills: 0 | Status: SHIPPED | OUTPATIENT
Start: 2019-10-29 | End: 2019-10-29 | Stop reason: SDUPTHER

## 2019-10-29 RX ORDER — VENLAFAXINE HYDROCHLORIDE 75 MG/1
150 CAPSULE, EXTENDED RELEASE ORAL DAILY
Qty: 30 CAPSULE | Refills: 0 | Status: SHIPPED | OUTPATIENT
Start: 2019-10-29 | End: 2020-09-17 | Stop reason: SDUPTHER

## 2019-10-29 NOTE — TELEPHONE ENCOUNTER
"Patient calling back to check on the status of her prescription for venlafaxine XR 75mg. States that this is the second day without her medications and she's starting to feel \"off\" and \"funny\" without it. States that she will be in [place] until around noon today and would prefer this to be sent to the [facility] in chart. If it is being sent after noon please send to the [facility] pharmacy on [address] (also listed in chart). Patient is also requesting a call back when the prescription is sent so she knows which pharmacy to go to. Please advise. Confirmed call back as [phone]  "

## 2020-08-25 ENCOUNTER — TELEMEDICINE (OUTPATIENT)
Dept: FAMILY MEDICINE CLINIC | Facility: TELEHEALTH | Age: 40
End: 2020-08-25

## 2020-08-25 ENCOUNTER — E-VISIT (OUTPATIENT)
Dept: FAMILY MEDICINE CLINIC | Facility: TELEHEALTH | Age: 40
End: 2020-08-25

## 2020-08-25 DIAGNOSIS — R39.9 UTI SYMPTOMS: Primary | ICD-10-CM

## 2020-08-25 PROCEDURE — 99213 OFFICE O/P EST LOW 20 MIN: CPT | Performed by: NURSE PRACTITIONER

## 2020-08-25 RX ORDER — DOXEPIN HYDROCHLORIDE 50 MG/1
CAPSULE ORAL AS NEEDED
COMMUNITY
Start: 2020-07-08 | End: 2023-02-08

## 2020-08-25 RX ORDER — LAMOTRIGINE 25 MG/1
TABLET ORAL
COMMUNITY
Start: 2020-08-16 | End: 2020-09-17 | Stop reason: SDUPTHER

## 2020-08-25 RX ORDER — NITROFURANTOIN 25; 75 MG/1; MG/1
100 CAPSULE ORAL 2 TIMES DAILY
Qty: 10 CAPSULE | Refills: 0 | Status: SHIPPED | OUTPATIENT
Start: 2020-08-25 | End: 2020-08-30

## 2020-08-25 NOTE — PATIENT INSTRUCTIONS
Increase fluid intake  Avoid caffeine and carbonation  If you develop fever or mid-back pain, go to ER  If symptoms worsen or do not improve in 48 hours, go to urgent care         Urinary Tract Infection, Adult  A urinary tract infection (UTI) is an infection of any part of the urinary tract. The urinary tract includes:  · The kidneys.  · The ureters.  · The bladder.  · The urethra.  These organs make, store, and get rid of pee (urine) in the body.  What are the causes?  This is caused by germs (bacteria) in your genital area. These germs grow and cause swelling (inflammation) of your urinary tract.  What increases the risk?  You are more likely to develop this condition if:  · You have a small, thin tube (catheter) to drain pee.  · You cannot control when you pee or poop (incontinence).  · You are female, and:  ? You use these methods to prevent pregnancy:  ? A medicine that kills sperm (spermicide).  ? A device that blocks sperm (diaphragm).  ? You have low levels of a female hormone (estrogen).  ? You are pregnant.  · You have genes that add to your risk.  · You are sexually active.  · You take antibiotic medicines.  · You have trouble peeing because of:  ? A prostate that is bigger than normal, if you are male.  ? A blockage in the part of your body that drains pee from the bladder (urethra).  ? A kidney stone.  ? A nerve condition that affects your bladder (neurogenic bladder).  ? Not getting enough to drink.  ? Not peeing often enough.  · You have other conditions, such as:  ? Diabetes.  ? A weak disease-fighting system (immune system).  ? Sickle cell disease.  ? Gout.  ? Injury of the spine.  What are the signs or symptoms?  Symptoms of this condition include:  · Needing to pee right away (urgently).  · Peeing often.  · Peeing small amounts often.  · Pain or burning when peeing.  · Blood in the pee.  · Pee that smells bad or not like normal.  · Trouble peeing.  · Pee that is cloudy.  · Fluid coming from the  vagina, if you are female.  · Pain in the belly or lower back.  Other symptoms include:  · Throwing up (vomiting).  · No urge to eat.  · Feeling mixed up (confused).  · Being tired and grouchy (irritable).  · A fever.  · Watery poop (diarrhea).  How is this treated?  This condition may be treated with:  · Antibiotic medicine.  · Other medicines.  · Drinking enough water.  Follow these instructions at home:    Medicines  · Take over-the-counter and prescription medicines only as told by your doctor.  · If you were prescribed an antibiotic medicine, take it as told by your doctor. Do not stop taking it even if you start to feel better.  General instructions  · Make sure you:  ? Pee until your bladder is empty.  ? Do not hold pee for a long time.  ? Empty your bladder after sex.  ? Wipe from front to back after pooping if you are a female. Use each tissue one time when you wipe.  · Drink enough fluid to keep your pee pale yellow.  · Keep all follow-up visits as told by your doctor. This is important.  Contact a doctor if:  · You do not get better after 1-2 days.  · Your symptoms go away and then come back.  Get help right away if:  · You have very bad back pain.  · You have very bad pain in your lower belly.  · You have a fever.  · You are sick to your stomach (nauseous).  · You are throwing up.  Summary  · A urinary tract infection (UTI) is an infection of any part of the urinary tract.  · This condition is caused by germs in your genital area.  · There are many risk factors for a UTI. These include having a small, thin tube to drain pee and not being able to control when you pee or poop.  · Treatment includes antibiotic medicines for germs.  · Drink enough fluid to keep your pee pale yellow.  This information is not intended to replace advice given to you by your health care provider. Make sure you discuss any questions you have with your health care provider.  Document Released: 06/05/2009 Document Revised:  12/05/2019 Document Reviewed: 06/27/2019  [employer] Patient Education © 2020 [employer]    Nitrofurantoin tablets or capsules  What is this medicine?  NITROFURANTOIN (jackie emmanuel) is an antibiotic. It is used to treat urinary tract infections.  This medicine may be used for other purposes; ask your health care provider or pharmacist if you have questions.  COMMON BRAND NAME(S): Macrobid, Macrodantin, Urotoin  What should I tell my health care provider before I take this medicine?  They need to know if you have any of these conditions:  · anemia  · diabetes  · glucose-6-phosphate dehydrogenase deficiency  · kidney disease  · liver disease  · lung disease  · other chronic illness  · an unusual or allergic reaction to nitrofurantoin, other antibiotics, other medicines, foods, dyes or preservatives  · pregnant or trying to get pregnant  · breast-feeding  How should I use this medicine?  Take this medicine by mouth with a glass of water. Follow the directions on the prescription label. Take this medicine with food or milk. Take your doses at regular intervals. Do not take your medicine more often than directed. Do not stop taking except on your doctor's advice.  Talk to your pediatrician regarding the use of this medicine in children. While this drug may be prescribed for selected conditions, precautions do apply.  Overdosage: If you think you have taken too much of this medicine contact a poison control center or emergency room at once.  NOTE: This medicine is only for you. Do not share this medicine with others.  What if I miss a dose?  If you miss a dose, take it as soon as you can. If it is almost time for your next dose, take only that dose. Do not take double or extra doses.  What may interact with this medicine?  · antacids containing magnesium trisilicate  · probenecid  · quinolone antibiotics like ciprofloxacin, lomefloxacin, norfloxacin and ofloxacin  · sulfinpyrazone  This list may not describe  all possible interactions. Give your health care provider a list of all the medicines, herbs, non-prescription drugs, or dietary supplements you use. Also tell them if you smoke, drink alcohol, or use illegal drugs. Some items may interact with your medicine.  What should I watch for while using this medicine?  Tell your doctor or health care professional if your symptoms do not improve or if you get new symptoms. Drink several glasses of water a day. If you are taking this medicine for a long time, visit your doctor for regular checks on your progress.  If you are diabetic, you may get a false positive result for sugar in your urine with certain brands of urine tests. Check with your doctor.  What side effects may I notice from receiving this medicine?  Side effects that you should report to your doctor or health care professional as soon as possible:  · allergic reactions like skin rash or hives, swelling of the face, lips, or tongue  · chest pain  · cough  · difficulty breathing  · dizziness, drowsiness  · fever or infection  · joint aches or pains  · pale or blue-tinted skin  · redness, blistering, peeling or loosening of the skin, including inside the mouth  · tingling, burning, pain, or numbness in hands or feet  · unusual bleeding or bruising  · unusually weak or tired  · yellowing of eyes or skin  Side effects that usually do not require medical attention (report to your doctor or health care professional if they continue or are bothersome):  · dark urine  · diarrhea  · headache  · loss of appetite  · nausea or vomiting  · temporary hair loss  This list may not describe all possible side effects. Call your doctor for medical advice about side effects. You may report side effects to FDA at 5-511-FDA-8771.  Where should I keep my medicine?  Keep out of the reach of children.  Store at room temperature between 15 and 30 degrees C (59 and 86 degrees F). Protect from light. Throw away any unused medicine after  the expiration date.  NOTE: This sheet is a summary. It may not cover all possible information. If you have questions about this medicine, talk to your doctor, pharmacist, or health care provider.  © 2020 Elsevier/Gold Standard (2009-07-08 15:56:47)

## 2020-08-25 NOTE — PROGRESS NOTES
Subjective   Lisa Grover is a 39 y.o. female.     She gets frequent UTIs. Symptoms started 3-4 days ago. She has frequent urination and urgency, incomplete urination, burning with urination, foul odor. Denies pain over the bladder. Denies fever or back pain. Denies history of kidney problems. Denies vaginal itching or discharge. She has been taking uristat.       The following portions of the patient's history were reviewed and updated as appropriate: allergies, current medications, past family history, past medical history, past social history, past surgical history and problem list.    Review of Systems   Constitutional: Negative for fever.   Genitourinary: Positive for dysuria, frequency and urgency.   Musculoskeletal: Negative for back pain.       Objective   Physical Exam   Constitutional: She is oriented to person, place, and time. She appears well-developed and well-nourished. No distress.   Pulmonary/Chest: Effort normal.   Neurological: She is alert and oriented to person, place, and time.   Skin: She is not diaphoretic.   Psychiatric: She has a normal mood and affect. Her behavior is normal.         Assessment/Plan   Diagnoses and all orders for this visit:    UTI symptoms    Other orders  -     nitrofurantoin, macrocrystal-monohydrate, (Macrobid) 100 MG capsule; Take 1 capsule by mouth 2 (Two) Times a Day for 5 days.      I spent 15 minutes in the patient's chart for this video visit.

## 2020-09-16 ENCOUNTER — OFFICE VISIT (OUTPATIENT)
Dept: INTERNAL MEDICINE | Facility: CLINIC | Age: 40
End: 2020-09-16

## 2020-09-16 VITALS
TEMPERATURE: 96.9 F | RESPIRATION RATE: 20 BRPM | HEART RATE: 100 BPM | BODY MASS INDEX: 24 KG/M2 | DIASTOLIC BLOOD PRESSURE: 64 MMHG | WEIGHT: 142 LBS | SYSTOLIC BLOOD PRESSURE: 100 MMHG

## 2020-09-16 DIAGNOSIS — F32.89 OTHER DEPRESSION: ICD-10-CM

## 2020-09-16 DIAGNOSIS — F41.1 GENERALIZED ANXIETY DISORDER: ICD-10-CM

## 2020-09-16 DIAGNOSIS — N92.6 MENSTRUAL DISORDER: ICD-10-CM

## 2020-09-16 DIAGNOSIS — R53.83 OTHER FATIGUE: Primary | ICD-10-CM

## 2020-09-16 PROCEDURE — 99213 OFFICE O/P EST LOW 20 MIN: CPT | Performed by: INTERNAL MEDICINE

## 2020-09-16 PROCEDURE — 80050 GENERAL HEALTH PANEL: CPT | Performed by: INTERNAL MEDICINE

## 2020-09-16 PROCEDURE — 83002 ASSAY OF GONADOTROPIN (LH): CPT | Performed by: INTERNAL MEDICINE

## 2020-09-16 PROCEDURE — 82746 ASSAY OF FOLIC ACID SERUM: CPT | Performed by: INTERNAL MEDICINE

## 2020-09-16 PROCEDURE — 82306 VITAMIN D 25 HYDROXY: CPT | Performed by: INTERNAL MEDICINE

## 2020-09-16 PROCEDURE — 86664 EPSTEIN-BARR NUCLEAR ANTIGEN: CPT | Performed by: INTERNAL MEDICINE

## 2020-09-16 PROCEDURE — G0432 EIA HIV-1/HIV-2 SCREEN: HCPCS | Performed by: INTERNAL MEDICINE

## 2020-09-16 PROCEDURE — 82607 VITAMIN B-12: CPT | Performed by: INTERNAL MEDICINE

## 2020-09-16 PROCEDURE — 84146 ASSAY OF PROLACTIN: CPT | Performed by: INTERNAL MEDICINE

## 2020-09-16 PROCEDURE — 83001 ASSAY OF GONADOTROPIN (FSH): CPT | Performed by: INTERNAL MEDICINE

## 2020-09-16 PROCEDURE — 86665 EPSTEIN-BARR CAPSID VCA: CPT | Performed by: INTERNAL MEDICINE

## 2020-09-16 PROCEDURE — 84439 ASSAY OF FREE THYROXINE: CPT | Performed by: INTERNAL MEDICINE

## 2020-09-16 NOTE — PROGRESS NOTES
Subjective       Lisa Grover is a 40 y.o. female.     Chief Complaint   Patient presents with   • Fatigue       History obtained from the patient.    The patient states her Depression and Anxiety are stable on current medications (Doxepin, Effexor XR, Lamictal, and Hydroxyzine).  She has not been drinking any alcohol.    Per patient she has had a negative cover test x2.      Fatigue  This is a new problem. Episode onset: 3-4 weeks ago. The problem occurs constantly. The problem has been unchanged. Associated symptoms include fatigue, myalgias and weakness. Pertinent negatives include no abdominal pain, arthralgias, chest pain, chills, congestion, coughing, fever, headaches, joint swelling, nausea, neck pain, numbness (and no tingling), rash, sore throat, swollen glands, vertigo, visual change or vomiting. Nothing aggravates the symptoms. Treatments tried: Multivitamin and B12. The treatment provided no relief.        The following portions of the patient's history were reviewed and updated as appropriate: allergies, current medications, past family history, past medical history, past social history, past surgical history and problem list.      Review of Systems   Constitutional: Positive for fatigue and unexpected weight change (up 12 pounds). Negative for chills and fever.   HENT: Positive for rhinorrhea (clear). Negative for congestion, ear pain, nosebleeds, sore throat and trouble swallowing.         There is been no loss of taste or smell.   Eyes: Negative for visual disturbance.   Respiratory: Negative for cough, shortness of breath and wheezing.    Cardiovascular: Negative for chest pain, palpitations and leg swelling.   Gastrointestinal: Negative for abdominal pain, blood in stool, constipation, nausea and vomiting.        Denies melena and hematemesis         Endocrine: Positive for cold intolerance and polydipsia. Negative for heat intolerance, polyphagia and polyuria.   Genitourinary: Positive  for menstrual problem ( heavy and clotting, seeing GYN, LMP 4 days ago ). Negative for dysuria, frequency, hematuria and urgency.   Musculoskeletal: Positive for myalgias. Negative for arthralgias, joint swelling and neck pain.   Skin: Negative for rash.   Neurological: Positive for weakness. Negative for dizziness, vertigo, light-headedness, numbness (and no tingling) and headaches.        Denies memory loss   Hematological: Negative for adenopathy. Bruises/bleeds easily (always bruises easily).   Psychiatric/Behavioral: Negative for decreased concentration and sleep disturbance. The patient is nervous/anxious.            Objective     Blood pressure 100/64, pulse 100, temperature 96.9 °F (36.1 °C), temperature source Temporal, resp. rate 20, weight 64.4 kg (142 lb).    Physical Exam  Vitals signs and nursing note reviewed.   Constitutional:       Appearance: She is well-developed and normal weight.   HENT:      Head: Normocephalic and atraumatic.      Comments: No maxillary or frontal sinus tenderness to palpation.     Right Ear: Tympanic membrane, ear canal and external ear normal.      Left Ear: Tympanic membrane, ear canal and external ear normal.      Mouth/Throat:      Mouth: Mucous membranes are moist. No oral lesions.      Pharynx: Oropharynx is clear.      Comments: Tonsils normal.  Eyes:      Conjunctiva/sclera: Conjunctivae normal.   Neck:      Musculoskeletal: Normal range of motion and neck supple.      Thyroid: No thyroid mass or thyromegaly.      Vascular: No carotid bruit.   Cardiovascular:      Rate and Rhythm: Normal rate and regular rhythm.      Pulses: Normal pulses.      Heart sounds: Normal heart sounds. No murmur. No friction rub. No gallop.    Pulmonary:      Effort: Pulmonary effort is normal.      Breath sounds: Normal breath sounds.   Abdominal:      General: Bowel sounds are normal. There is no distension or abdominal bruit.      Palpations: Abdomen is soft. There is no hepatomegaly,  splenomegaly or mass.      Tenderness: There is abdominal tenderness (mild suprapubic). There is no right CVA tenderness, left CVA tenderness, guarding or rebound.   Musculoskeletal:      Right lower leg: No edema.      Left lower leg: No edema.   Lymphadenopathy:      Cervical: No cervical adenopathy.      Upper Body:      Right upper body: No supraclavicular adenopathy.      Left upper body: No supraclavicular adenopathy.   Skin:     Findings: No rash.   Neurological:      Mental Status: She is alert.   Psychiatric:         Mood and Affect: Mood normal.           Assessment/Plan   Lisa was seen today for fatigue.    Diagnoses and all orders for this visit:    Other fatigue  -     Comprehensive Metabolic Panel  -     Vitamin B12  -     CBC & Differential  -     Vitamin D 25 Hydroxy  -     TSH  -     T4, Free  -     HIV-1 / O / 2 Ag / Antibody 4th Generation  -     Cancel: [facility] Virus VCA Antibody Panel  -     Folate  -     Prolactin  -     Follicle Stimulating Hormone  -     Luteinizing Hormone  -     EBV Antibody Profile  -     CBC Auto Differential    Menstrual disorder  -     Comprehensive Metabolic Panel  -     CBC & Differential  -     TSH  -     T4, Free  -     Prolactin  -     Follicle Stimulating Hormone  -     Luteinizing Hormone  -     CBC Auto Differential    Generalized anxiety disorder  -     hydrOXYzine (ATARAX) 10 MG tablet; Take 1 tablet by mouth As Needed for Anxiety. PRN anxiety; For: Anxiety disorder, Depression (med list update)  -     venlafaxine XR (Effexor XR) 75 MG 24 hr capsule; Take 3 capsules by mouth Daily (med list update).    Other depression  -     hydrOXYzine (ATARAX) 10 MG tablet; Take 1 tablet by mouth As Needed for Anxiety. PRN anxiety; For: Anxiety disorder, Depression (med list update)  -     venlafaxine XR (Effexor XR) 75 MG 24 hr capsule; Take 3 capsules by mouth Daily (med list update).            Return in about 1 month (around 10/16/2020) for Annual physical,  fasting.

## 2020-09-17 LAB
25(OH)D3 SERPL-MCNC: 39.1 NG/ML (ref 30–100)
ALBUMIN SERPL-MCNC: 3.9 G/DL (ref 3.5–5.2)
ALBUMIN/GLOB SERPL: 1.5 G/DL
ALP SERPL-CCNC: 94 U/L (ref 39–117)
ALT SERPL W P-5'-P-CCNC: 30 U/L (ref 1–33)
ANION GAP SERPL CALCULATED.3IONS-SCNC: 7.8 MMOL/L (ref 5–15)
AST SERPL-CCNC: 27 U/L (ref 1–32)
BASOPHILS # BLD AUTO: 0.05 10*3/MM3 (ref 0–0.2)
BASOPHILS NFR BLD AUTO: 0.8 % (ref 0–1.5)
BILIRUB SERPL-MCNC: 0.2 MG/DL (ref 0–1.2)
BUN SERPL-MCNC: 16 MG/DL (ref 6–20)
BUN/CREAT SERPL: 22.9 (ref 7–25)
CALCIUM SPEC-SCNC: 8.9 MG/DL (ref 8.6–10.5)
CHLORIDE SERPL-SCNC: 107 MMOL/L (ref 98–107)
CO2 SERPL-SCNC: 28.2 MMOL/L (ref 22–29)
CREAT SERPL-MCNC: 0.7 MG/DL (ref 0.57–1)
DEPRECATED RDW RBC AUTO: 47.9 FL (ref 37–54)
EOSINOPHIL # BLD AUTO: 0.1 10*3/MM3 (ref 0–0.4)
EOSINOPHIL NFR BLD AUTO: 1.7 % (ref 0.3–6.2)
ERYTHROCYTE [DISTWIDTH] IN BLOOD BY AUTOMATED COUNT: 14.5 % (ref 12.3–15.4)
FOLATE SERPL-MCNC: 18.1 NG/ML (ref 4.78–24.2)
FSH SERPL-ACNC: 3.44 MIU/ML
GFR SERPL CREATININE-BSD FRML MDRD: 94 ML/MIN/1.73
GLOBULIN UR ELPH-MCNC: 2.6 GM/DL
GLUCOSE SERPL-MCNC: 66 MG/DL (ref 65–99)
HCT VFR BLD AUTO: 37.2 % (ref 34–46.6)
HGB BLD-MCNC: 12.5 G/DL (ref 12–15.9)
HIV1+2 AB SER QL: NORMAL
IMM GRANULOCYTES # BLD AUTO: 0.01 10*3/MM3 (ref 0–0.05)
IMM GRANULOCYTES NFR BLD AUTO: 0.2 % (ref 0–0.5)
LH SERPL-ACNC: 8.63 MIU/ML
LYMPHOCYTES # BLD AUTO: 2.65 10*3/MM3 (ref 0.7–3.1)
LYMPHOCYTES NFR BLD AUTO: 44.5 % (ref 19.6–45.3)
MCH RBC QN AUTO: 30.5 PG (ref 26.6–33)
MCHC RBC AUTO-ENTMCNC: 33.6 G/DL (ref 31.5–35.7)
MCV RBC AUTO: 90.7 FL (ref 79–97)
MONOCYTES # BLD AUTO: 0.72 10*3/MM3 (ref 0.1–0.9)
MONOCYTES NFR BLD AUTO: 12.1 % (ref 5–12)
NEUTROPHILS NFR BLD AUTO: 2.42 10*3/MM3 (ref 1.7–7)
NEUTROPHILS NFR BLD AUTO: 40.7 % (ref 42.7–76)
NRBC BLD AUTO-RTO: 0 /100 WBC (ref 0–0.2)
PLATELET # BLD AUTO: 177 10*3/MM3 (ref 140–450)
PMV BLD AUTO: 9.3 FL (ref 6–12)
POTASSIUM SERPL-SCNC: 4.2 MMOL/L (ref 3.5–5.2)
PROLACTIN SERPL-MCNC: 8.15 NG/ML (ref 4.79–23.3)
PROT SERPL-MCNC: 6.5 G/DL (ref 6–8.5)
RBC # BLD AUTO: 4.1 10*6/MM3 (ref 3.77–5.28)
SODIUM SERPL-SCNC: 143 MMOL/L (ref 136–145)
T4 FREE SERPL-MCNC: 0.71 NG/DL (ref 0.93–1.7)
TSH SERPL DL<=0.05 MIU/L-ACNC: 1 UIU/ML (ref 0.27–4.2)
VIT B12 BLD-MCNC: 466 PG/ML (ref 211–946)
WBC # BLD AUTO: 5.95 10*3/MM3 (ref 3.4–10.8)

## 2020-09-17 RX ORDER — HYDROXYZINE HYDROCHLORIDE 10 MG/1
10 TABLET, FILM COATED ORAL AS NEEDED
Start: 2020-09-17 | End: 2023-02-08

## 2020-09-17 RX ORDER — VENLAFAXINE HYDROCHLORIDE 75 MG/1
225 CAPSULE, EXTENDED RELEASE ORAL DAILY
Start: 2020-09-17 | End: 2021-06-07 | Stop reason: SDUPTHER

## 2020-09-18 LAB
EBV NA IGG SER IA-ACNC: 566 U/ML (ref 0–17.9)
EBV VCA IGG SER IA-ACNC: >600 U/ML (ref 0–17.9)
EBV VCA IGM SER IA-ACNC: 140 U/ML (ref 0–35.9)
SERVICE CMNT-IMP: ABNORMAL

## 2020-09-21 ENCOUNTER — TELEPHONE (OUTPATIENT)
Dept: INTERNAL MEDICINE | Facility: CLINIC | Age: 40
End: 2020-09-21

## 2020-09-21 NOTE — TELEPHONE ENCOUNTER
Labs are normal.  EBV titers are elevated, but pattern indicates a past Mononucleosis infection.  Lab letter sent.

## 2020-09-21 NOTE — TELEPHONE ENCOUNTER
Noemí notified Dr Genao is out of the office today and will call her tomorrow with the labs as Dr Genao has not signed off on them yet   Verb understanding given

## 2020-09-21 NOTE — TELEPHONE ENCOUNTER
Patient states that she would like to talk with Dr. Genao's nurse to discuss the recent tests that she had done.  She can be reached at [phone]

## 2021-06-01 ENCOUNTER — TELEPHONE (OUTPATIENT)
Dept: INTERNAL MEDICINE | Facility: CLINIC | Age: 41
End: 2021-06-01

## 2021-06-01 NOTE — TELEPHONE ENCOUNTER
PT'S FRIEND CALLED TO SEE IF PT MADE IT TO APPOINTMENT AND I WASN'T ABLE TO TELL HER SHE WASN'T ON VERBAL BUT SHE SEEMED VERY CONCERN AND SAID PT HAD BEEN MISSING SO I SPOKE TO DR. WEST AND TOLD HER I WOULD TRY TO REACH OUT TO PT . I CALLED PT AND LVM

## 2021-06-04 NOTE — TELEPHONE ENCOUNTER
LMOVM to return call.  Second time without reaching her by phone .  LMOVM to return call.  Office # given

## 2021-06-07 ENCOUNTER — OFFICE VISIT (OUTPATIENT)
Dept: INTERNAL MEDICINE | Facility: CLINIC | Age: 41
End: 2021-06-07

## 2021-06-07 VITALS
HEART RATE: 85 BPM | HEIGHT: 65 IN | TEMPERATURE: 98 F | DIASTOLIC BLOOD PRESSURE: 64 MMHG | RESPIRATION RATE: 20 BRPM | BODY MASS INDEX: 19.87 KG/M2 | WEIGHT: 119.25 LBS | SYSTOLIC BLOOD PRESSURE: 99 MMHG

## 2021-06-07 DIAGNOSIS — Z72.51 HIGH RISK HETEROSEXUAL BEHAVIOR: ICD-10-CM

## 2021-06-07 DIAGNOSIS — F32.89 OTHER DEPRESSION: ICD-10-CM

## 2021-06-07 DIAGNOSIS — Z00.00 ENCOUNTER FOR HEALTH MAINTENANCE EXAMINATION IN ADULT: Primary | ICD-10-CM

## 2021-06-07 DIAGNOSIS — F41.1 GENERALIZED ANXIETY DISORDER: ICD-10-CM

## 2021-06-07 DIAGNOSIS — Z13.6 SCREENING FOR CARDIOVASCULAR CONDITION: ICD-10-CM

## 2021-06-07 DIAGNOSIS — R63.4 ABNORMAL WEIGHT LOSS: ICD-10-CM

## 2021-06-07 DIAGNOSIS — E53.8 VITAMIN B12 DEFICIENCY: ICD-10-CM

## 2021-06-07 DIAGNOSIS — R22.9 MULTIPLE SKIN NODULES: ICD-10-CM

## 2021-06-07 PROCEDURE — 99212 OFFICE O/P EST SF 10 MIN: CPT | Performed by: INTERNAL MEDICINE

## 2021-06-07 PROCEDURE — 99395 PREV VISIT EST AGE 18-39: CPT | Performed by: INTERNAL MEDICINE

## 2021-06-07 RX ORDER — VENLAFAXINE 75 MG/1
75 TABLET ORAL DAILY
COMMUNITY
End: 2022-07-22 | Stop reason: SDUPTHER

## 2021-06-07 RX ORDER — VENLAFAXINE HYDROCHLORIDE 150 MG/1
150 CAPSULE, EXTENDED RELEASE ORAL DAILY
COMMUNITY
End: 2022-07-22 | Stop reason: SDUPTHER

## 2021-06-07 RX ORDER — VENLAFAXINE HYDROCHLORIDE 150 MG/1
1 CAPSULE, EXTENDED RELEASE ORAL DAILY
COMMUNITY
Start: 2021-04-28 | End: 2021-06-07 | Stop reason: SDUPTHER

## 2021-06-07 NOTE — PROGRESS NOTES
Subjective     Chief Complaint:  Physical Exam.    History of Present Illness    History obtained from the patient.    The patient's weight is down 23 pounds from 9/23/2020.  She states she follows a heart healthy diet, but that is not new.  She does not exercise, and that is also not new.  She denies any excessive dieting, binging, or purging.  She is not on any new medication.  She is under a lot of stress due to a recent bad break-up.  She has also just moved.  She does not have a history of an eating disorder.  Labs done 9/16/2020 were significant for slightly low T4 (0.71).  She also had positive EBV titers at the time, consistent with Acute Mononucleosis.    The patient is complaining of a nodule on her left outer thigh for the past 1 year.  She feels like it has increased in size.  About 1 month ago, she noticed another nodule on her left anterior thigh.  There is no pain, but occasionally she has numbness at night.  There is no tingling, fever, chills, or rash.  She has not treated this with anything.    The patient has a history of GERD, long-term stable without medications, essentially resolved.    The patient has a history of Vitamin B12 deficiency.  Last Vitamin B12 level on 9/16/2020 was 466.  She takes a Multivitamin daily, but no additional B12 supplements.     The patient has a history of Depression and Anxiety, followed by [facility].  She is back on Effexor and Lamictal.  She takes Propranolol as needed for anxiety.  She takes Doxepin rarely.  She is on Hydroxyzine. She has a history of Alcohol Abuse Disorder.   She last quit drinking 12/15/2019, but states she has had a few relapses since then.  She is not currently drinking alcohol.    Lisa Grover is a 40 y.o. female who presents for an Annual Physical.      PMH, PSH, SocHx, FamHx, Allergies, and Medications: Reviewed and updated.    Outpatient Medications Prior to Visit   Medication Sig Dispense Refill   •  doxepin (SINEquan) 50 MG capsule As Needed.     • hydrOXYzine (ATARAX) 10 MG tablet Take 1 tablet by mouth As Needed for Anxiety. PRN anxiety; For: Anxiety disorder, Depression     • LamoTRIgine (LAMICTAL PO) Take 75 mg by mouth Daily.     • propranolol (INDERAL) 10 MG tablet Take 1 tablet by mouth Daily As Needed (prn). 90 tablet 3   • valACYclovir (VALTREX) 500 MG tablet Take 1 tablet by mouth Daily. (Patient taking differently: Take 500 mg by mouth Daily As Needed.) 30 tablet 5   • venlafaxine (EFFEXOR) 75 MG tablet Take 75 mg by mouth Daily.     • venlafaxine XR (EFFEXOR-XR) 150 MG 24 hr capsule Take 150 mg by mouth Daily.     • venlafaxine XR (Effexor XR) 75 MG 24 hr capsule Take 3 capsules by mouth Daily.     • venlafaxine XR (EFFEXOR-XR) 150 MG 24 hr capsule 1 capsule Daily.       No facility-administered medications prior to visit.       Immunization History   Administered Date(s) Administered   • COVID-19 (PFIZER) 03/04/2021, 04/16/2021   • Flu Mist 11/20/2015   • Flulaval/Fluarix/Fluzone Quad 10/03/2019   • PPD Test 05/04/2015   • Pneumococcal Polysaccharide (PPSV23) 09/05/2019   • Tdap 09/05/2019         Patient Active Problem List   Diagnosis   • Alcoholism (CMS/[facility])   • Anxiety disorder   • Depression   • Gastroesophageal reflux disease   • Herpes simplex type 1 infection   • Substance abuse (CMS/[facility])   • Vitamin B12 deficiency       Health Habits:  Dental Exam. up to date  Eye Exam. up to date  Hearing Loss:  No  Exercise: 0 times/week.  Current exercise activities include: none  Diet: Healthy  Multivitamin: Yes    Safe Driving:  Yes  Seat Belt:  Yes  Bike Helmet:  N/A  Skin Screening:  Yes  Sunscreen: Yes  SBE / GERARDO: Yes  Sexual Activity:  Yes  Birth Control:  Condoms, intermittently  STD Prevention:   Condoms, intermittently    Last Pap: 2016, normal per patient (GYN)  Last Mammogram:  N/A  Last DEXA Scan: N/A  Last Colonoscopy: N/A  Last PSA: N/A    Social:    Social History     Socioeconomic  History   • Marital status: Single     Spouse name: Not on file   • Number of children: 1   • Years of education: Not on file   • Highest education level: Not on file   Tobacco Use   • Smoking status: Current Every Day Smoker     Packs/day: 0.50     Years: 15.00     Pack years: 7.50     Start date: 2000   • Smokeless tobacco: Never Used   • Tobacco comment: 1/2  ppd 2000-present   Substance and Sexual Activity   • Alcohol use: Not Currently     Comment: 12/15/2018 stopped drinking  - sober living  --heavy use [id number], then 3-4 drinks per week.. Quit 12/15/19, a few relapses   • Drug use: No     Comment: h/o Cocaine use   • Sexual activity: Yes     Partners: Male     Birth control/protection: Condom         Current Medical Providers:    Reyna Genao MD (Internal Medicine / Pediatrics)    The [facility] providers who are involved in the care of this patient are listed above.         Review of Systems   Constitutional: Positive for unexpected weight change. Negative for chills, fatigue and fever.        Has night sweats, since re-starting Lamictal.    HENT: Negative for congestion, ear pain, hearing loss, nosebleeds, postnasal drip, rhinorrhea, sinus pressure, sinus pain, sneezing, sore throat, tinnitus and voice change.         Denies snoring.   Eyes: Negative for photophobia, pain, discharge, redness, itching and visual disturbance.   Respiratory: Negative for cough, chest tightness, shortness of breath, wheezing and stridor.         No chest congestion.  No hemoptysis.   Cardiovascular: Negative for chest pain, palpitations and leg swelling.        No orthopnea, RAGSDALE, or PND.  No claudication or syncope.   Gastrointestinal: Negative for abdominal pain, blood in stool, constipation, diarrhea, nausea, rectal pain and vomiting.        No melena.  No hematemesis.  No heartburn, dysphagia or odynophagia.  No early satiety, belching, or bloating.    Endocrine: Negative for cold intolerance, heat intolerance,  "polydipsia, polyphagia and polyuria.        No hair loss or dry skin.  No hot flashes.     Genitourinary: Negative for difficulty urinating, dyspareunia, dysuria, flank pain, frequency, hematuria, menstrual problem, pelvic pain, urgency, vaginal bleeding and vaginal discharge.        No nocturia, incomplete emptying, or incontinence.   Musculoskeletal: Negative for arthralgias, back pain, gait problem, joint swelling, myalgias, neck pain and neck stiffness.        No joint stiffness.   Skin: Negative for rash.        No new skin lesions or changes in skin lesions. No breast pain or masses.  No nipple discharge or nipple inversion.   Neurological: Positive for numbness. Negative for dizziness, tremors, syncope, speech difficulty, weakness, light-headedness and headaches.        No tingling.  No memory loss.  No decreased concentration.   Hematological: Negative for adenopathy. Bruises/bleeds easily (bruises easily, not new).   Psychiatric/Behavioral: Negative for confusion, sleep disturbance and suicidal ideas. The patient is nervous/anxious.         Stable depression.           Objective     Vitals:    06/07/21 1640   BP: 99/64   BP Location: Right arm   Pulse: 85   Resp: 20   Temp: 98 °F (36.7 °C)   TempSrc: Temporal   Weight: 54.1 kg (119 lb 4 oz)   Height: 163.8 cm (64.5\")       Body mass index is 20.15 kg/m².    Physical Exam  Vitals and nursing note reviewed.   Constitutional:       Appearance: Normal appearance. She is well-developed and normal weight.   HENT:      Head: Normocephalic and atraumatic.      Right Ear: Tympanic membrane, ear canal and external ear normal.      Left Ear: Tympanic membrane, ear canal and external ear normal.      Mouth/Throat:      Mouth: Mucous membranes are moist. No oral lesions.      Pharynx: Oropharynx is clear.      Comments: Tonsils normal.  Eyes:      Extraocular Movements: Extraocular movements intact.      Conjunctiva/sclera: Conjunctivae normal.      Pupils: Pupils are " equal, round, and reactive to light.      Comments: Fundi normal bilaterally.   Neck:      Thyroid: No thyromegaly.      Vascular: No carotid bruit.   Cardiovascular:      Rate and Rhythm: Normal rate and regular rhythm.      Pulses: Normal pulses.      Heart sounds: Normal heart sounds. No murmur heard.   No friction rub. No gallop.    Pulmonary:      Effort: Pulmonary effort is normal.      Breath sounds: Normal breath sounds.   Chest:      Breasts:         Right: No inverted nipple, mass, nipple discharge, skin change or tenderness.         Left: No inverted nipple, mass, nipple discharge, skin change or tenderness.   Abdominal:      General: Bowel sounds are normal. There is no distension or abdominal bruit.      Palpations: Abdomen is soft. There is no hepatomegaly, splenomegaly or mass.      Tenderness: There is no abdominal tenderness.   Genitourinary:     Comments:  and rectal exam deferred.  Musculoskeletal:         General: Normal range of motion.      Cervical back: Normal range of motion and neck supple.      Right lower leg: No edema.      Left lower leg: No edema.   Lymphadenopathy:      Cervical: No cervical adenopathy.      Upper Body:      Right upper body: No supraclavicular or axillary adenopathy.      Left upper body: No supraclavicular or axillary adenopathy.   Skin:     Findings: No rash.      Comments: There are 2 flesh-colored nodular lesions on the left thigh, 1 laterally and one anteriorly, which are nontender to palpation.  No additional atypical skin lesions.   Neurological:      Mental Status: She is alert.      Cranial Nerves: Cranial nerves are intact.      Motor: Motor function is intact. No abnormal muscle tone.      Coordination: Coordination is intact.      Gait: Gait is intact.      Deep Tendon Reflexes: Reflexes are normal and symmetric.   Psychiatric:         Mood and Affect: Mood normal.         PHQ-2 Depression Screening  Little interest or pleasure in doing things? 0    Feeling down, depressed, or hopeless? 0   PHQ-2 Total Score 0         Counseling was given to patient for the following topics:  appropriate exercise, healthy eating habits, disease prevention, risk factors for cancer, importance of self breast exam and breast health, importance of immunizations, including risks and benefits, sun safety, seatbelt use, safe driving and safe sex. Also discussed the importance of regular dental and vision care, as well recommendation for a yearly screening skin exam after age 40.  Written information provided to patient on these topics and other health maintenance issues.      Assessment/Plan       Diagnoses and all orders for this visit:    1. Encounter for health maintenance examination in adult (Primary)  -     Lipid Panel; Future  -     Comprehensive Metabolic Panel; Future  -     TSH; Future  -     Vitamin D 25 Hydroxy; Future  -     CBC & Differential; Future  -     Hepatitis C Antibody; Future  -     HIV-1 / O / 2 Ag / Antibody 4th Generation; Future  -     T4, Free; Future  -     Vitamin B12; Future  -     POC Urinalysis Dipstick, Automated; Future    2. Vitamin B12 deficiency  -     Vitamin B12; Future   Continue Multivitamin    3. Other depression  -     Vitamin D 25 Hydroxy; Future   Continue current medication(s) as noted in the history of present illness.   Follow up per Psychiatry.    4. Generalized anxiety disorder  -     Vitamin D 25 Hydroxy; Future   Continue current medication(s) as noted in the history of present illness.   Follow up per Psychiatry.      5. Abnormal weight loss  -     Comprehensive Metabolic Panel; Future  -     TSH; Future  -     CBC & Differential; Future  -     T4, Free; Future    6. High risk heterosexual behavior  -     Hepatitis C Antibody; Future  -     HIV-1 / O / 2 Ag / Antibody 4th Generation; Future    7. Multiple skin nodules (? phleboliths)  -     Ambulatory Referral to Dermatology    8. Screening for cardiovascular condition  -      Lipid Panel; Future  -     Comprehensive Metabolic Panel; Future  -     TSH; Future  -     Vitamin D 25 Hydroxy; Future  -     CBC & Differential; Future  -     T4, Free; Future      Patient's Body mass index is 20.15 kg/m². indicating that she is within normal range (BMI 18.5-24.9). No BMI management plan needed..      The patient agrees to return for fasting labs.    The patient agrees to schedule her Pap Smear with her gynecologist as soon as possible.    Recommended Hepatitis A vaccination at the pharmacy.          Return in about 1 year (around 6/7/2022) for Annual physical, fasting.

## 2021-06-07 NOTE — PATIENT INSTRUCTIONS
I recommend Hepatitis A vaccination at the pharmacy.    Please return for fasting labs.    Please schedule your Pap Smear with your gynecologist as soon as possible.      Health Maintenance, Female  Adopting a healthy lifestyle and getting preventive care are important in promoting health and wellness. Ask your health care provider about:  · The right schedule for you to have regular tests and exams.  · Things you can do on your own to prevent diseases and keep yourself healthy.  What should I know about diet, weight, and exercise?  Eat a healthy diet    · Eat a diet that includes plenty of vegetables, fruits, low-fat dairy products, and lean protein.  · Do not eat a lot of foods that are high in solid fats, added sugars, or sodium.  Maintain a healthy weight  Body mass index (BMI) is used to identify weight problems. It estimates body fat based on height and weight. Your health care provider can help determine your BMI and help you achieve or maintain a healthy weight.  Get regular exercise  Get regular exercise. This is one of the most important things you can do for your health. Most adults should:  · Exercise for at least 150 minutes each week. The exercise should increase your heart rate and make you sweat (moderate-intensity exercise).  · Do strengthening exercises at least twice a week. This is in addition to the moderate-intensity exercise.  · Spend less time sitting. Even light physical activity can be beneficial.  Watch cholesterol and blood lipids  Have your blood tested for lipids and cholesterol at 20 years of age, then have this test every 5 years.  Have your cholesterol levels checked more often if:  · Your lipid or cholesterol levels are high.  · You are older than 40 years of age.  · You are at high risk for heart disease.  What should I know about cancer screening?  Depending on your health history and family history, you may need to have cancer screening at various ages. This may include screening  for:  · Breast cancer.  · Cervical cancer.  · Colorectal cancer.  · Skin cancer.  · Lung cancer.  What should I know about heart disease, diabetes, and high blood pressure?  Blood pressure and heart disease  · High blood pressure causes heart disease and increases the risk of stroke. This is more likely to develop in people who have high blood pressure readings, are of  descent, or are overweight.  · Have your blood pressure checked:  ? Every 3-5 years if you are 18-39 years of age.  ? Every year if you are 40 years old or older.  Diabetes  Have regular diabetes screenings. This checks your fasting blood sugar level. Have the screening done:  · Once every three years after age 40 if you are at a normal weight and have a low risk for diabetes.  · More often and at a younger age if you are overweight or have a high risk for diabetes.  What should I know about preventing infection?  Hepatitis B  If you have a higher risk for hepatitis B, you should be screened for this virus. Talk with your health care provider to find out if you are at risk for hepatitis B infection.  Hepatitis C  Testing is recommended for:  · Everyone born from 1945 through 1965.  · Anyone with known risk factors for hepatitis C.  Sexually transmitted infections (STIs)  · Get screened for STIs, including gonorrhea and chlamydia, if:  ? You are sexually active and are younger than 24 years of age.  ? You are older than 24 years of age and your health care provider tells you that you are at risk for this type of infection.  ? Your sexual activity has changed since you were last screened, and you are at increased risk for chlamydia or gonorrhea. Ask your health care provider if you are at risk.  · Ask your health care provider about whether you are at high risk for HIV. Your health care provider may recommend a prescription medicine to help prevent HIV infection. If you choose to take medicine to prevent HIV, you should first get tested for HIV.  You should then be tested every 3 months for as long as you are taking the medicine.  Pregnancy  · If you are about to stop having your period (premenopausal) and you may become pregnant, seek counseling before you get pregnant.  · Take 400 to 800 micrograms (mcg) of folic acid every day if you become pregnant.  · Ask for birth control (contraception) if you want to prevent pregnancy.  Osteoporosis and menopause  Osteoporosis is a disease in which the bones lose minerals and strength with aging. This can result in bone fractures. If you are 65 years old or older, or if you are at risk for osteoporosis and fractures, ask your health care provider if you should:  · Be screened for bone loss.  · Take a calcium or vitamin D supplement to lower your risk of fractures.  · Be given hormone replacement therapy (HRT) to treat symptoms of menopause.  Follow these instructions at home:  Lifestyle  · Do not use any products that contain nicotine or tobacco, such as cigarettes, e-cigarettes, and chewing tobacco. If you need help quitting, ask your health care provider.  · Do not use street drugs.  · Do not share needles.  · Ask your health care provider for help if you need support or information about quitting drugs.  Alcohol use  · Do not drink alcohol if:  ? Your health care provider tells you not to drink.  ? You are pregnant, may be pregnant, or are planning to become pregnant.  · If you drink alcohol:  ? Limit how much you use to 0-1 drink a day.  ? Limit intake if you are breastfeeding.  · Be aware of how much alcohol is in your drink. In the [place] one drink equals one 12 oz bottle of beer (355 mL), one 5 oz glass of wine (148 mL), or one 1½ oz glass of hard liquor (44 mL).  General instructions  · Schedule regular health, dental, and eye exams.  · Stay current with your vaccines.  · Tell your health care provider if:  ? You often feel depressed.  ? You have ever been abused or do not feel safe at  home.  Summary  · Adopting a healthy lifestyle and getting preventive care are important in promoting health and wellness.  · Follow your health care provider's instructions about healthy diet, exercising, and getting tested or screened for diseases.  · Follow your health care provider's instructions on monitoring your cholesterol and blood pressure.  This information is not intended to replace advice given to you by your health care provider. Make sure you discuss any questions you have with your health care provider.  Document Revised: 12/11/2019 Document Reviewed: 12/11/2019  [employer] Patient Education © 2021 [employer]      Safe Sex  Practicing safe sex means taking steps before and during sex to reduce your risk of:  · Getting an STI (sexually transmitted infection).  · Giving your partner an STI.  · Unwanted or unplanned pregnancy.  How can I practice safe sex?         Ways you can practice safe sex  · Limit your sexual partners to only one partner who is having sex with only you.  · Avoid using alcohol and drugs before having sex. Alcohol and drugs can affect your judgment.  · Before having sex with a new partner:  ? Talk to your partner about past partners, past STIs, and drug use.  ? Get screened for STIs and discuss the results with your partner. Ask your partner to get screened, too.  · Check your body regularly for sores, blisters, rashes, or unusual discharge. If you notice any of these problems, visit your health care provider.  · Avoid sexual contact if you have symptoms of an infection or you are being treated for an STI.  · While having sex, use a condom. Make sure to:  ? Use a condom every time you have vaginal, oral, or anal sex. Both females and males should wear condoms during oral sex.  ? Keep condoms in place from the beginning to the end of sexual activity.  ? Use a latex condom, if possible. Latex condoms offer the best protection.  ? Use only water-based lubricants with a condom. Using  petroleum-based lubricants or oils will weaken the condom and increase the chance that it will break.  Ways your health care provider can help you practice safe sex  · See your health care provider for regular screenings, exams, and tests for STIs.  · Talk with your health care provider about what kind of birth control (contraception) is best for you.  · Get vaccinated against hepatitis B and human papillomavirus (HPV).  · If you are at risk of being infected with HIV (human immunodeficiency virus), talk with your health care provider about taking a prescription medicine to prevent HIV infection. You are at risk for HIV if you:  ? Are a man who has sex with other men.  ? Are sexually active with more than one partner.  ? Take drugs by injection.  ? Have a sex partner who has HIV.  ? Have unprotected sex.  ? Have sex with someone who has sex with both men and women.  ? Have had an STI.  Follow these instructions at home:  · Take over-the-counter and prescription medicines as told by your health care provider.  · Keep all follow-up visits as told by your health care provider. This is important.  Where to find more information  · Centers for Disease Control and Prevention: https://www.cdc.gov/std/prevention/default.htm  · Planned Parenthood: https://www.plannedparenthood.org/  · Office on Women's Health: https://www.womenshealth.gov/a-z-topics/sexually-transmitted-infections  Summary  · Practicing safe sex means taking steps before and during sex to reduce your risk of STIs, giving your partner STIs, and having an unwanted or unplanned pregnancy.  · Before having sex with a new partner, talk to your partner about past partners, past STIs, and drug use.  · Use a condom every time you have vaginal, oral, or anal sex. Both females and males should wear condoms during oral sex.  · Check your body regularly for sores, blisters, rashes, or unusual discharge. If you notice any of these problems, visit your health care  provider.  · See your health care provider for regular screenings, exams, and tests for STIs.  This information is not intended to replace advice given to you by your health care provider. Make sure you discuss any questions you have with your health care provider.  Document Revised: 04/10/2020 Document Reviewed: 09/30/2019  [employer] Patient Education © 2021 [employer]

## 2021-06-15 ENCOUNTER — TELEMEDICINE (OUTPATIENT)
Dept: FAMILY MEDICINE CLINIC | Facility: TELEHEALTH | Age: 41
End: 2021-06-15

## 2021-06-15 DIAGNOSIS — R39.89 SUSPECTED UTI: Primary | ICD-10-CM

## 2021-06-15 PROCEDURE — 99213 OFFICE O/P EST LOW 20 MIN: CPT | Performed by: NURSE PRACTITIONER

## 2021-06-15 RX ORDER — PHENAZOPYRIDINE HYDROCHLORIDE 200 MG/1
200 TABLET, FILM COATED ORAL 3 TIMES DAILY PRN
Qty: 6 TABLET | Refills: 0 | Status: SHIPPED | OUTPATIENT
Start: 2021-06-15 | End: 2021-06-17

## 2021-06-15 RX ORDER — NITROFURANTOIN 25; 75 MG/1; MG/1
100 CAPSULE ORAL 2 TIMES DAILY
Qty: 14 CAPSULE | Refills: 0 | Status: SHIPPED | OUTPATIENT
Start: 2021-06-15 | End: 2021-06-22

## 2021-06-15 NOTE — PROGRESS NOTES
HPI  Lisa Grover is a 40 y.o. female  presents with complaint of burning with urination, urgency, feels like small amount of urine for 2 days. Has history of these after intercourse sometimes.    Review of Systems   Constitutional: Negative for chills, diaphoresis and fever.   Gastrointestinal: Negative for abdominal pain, diarrhea, nausea and vomiting.   Genitourinary: Positive for dysuria, frequency and urgency.       Past Medical History:   Diagnosis Date   • Anxiety    • Cervical dysplasia     s/p cryotherapy   • Depression    • Gastroesophageal reflux disease    • Heart murmur At birth.    Resolved on its own   • History of blood transfusion 2017    2 units   • History of spontaneous  2013    no D&C   • Substance abuse (CMS/[facility])    • Urinary tract infection        Family History   Problem Relation Age of Onset   • Alcohol abuse Father    • Other Father         Prostate issues   • Hearing loss Maternal Grandfather    • COPD Mother    • Depression Mother    • Anxiety disorder Mother    • Rheum arthritis Mother    • Depression Maternal Grandmother    • Anxiety disorder Maternal Grandmother    • No Known Problems Paternal Grandmother    • Lung cancer Paternal Grandfather    • Depression Half-Sister    • Anxiety disorder Half-Sister    • Dementia Maternal Aunt        Social History     Socioeconomic History   • Marital status: Single     Spouse name: Not on file   • Number of children: 1   • Years of education: Not on file   • Highest education level: Not on file   Tobacco Use   • Smoking status: Current Every Day Smoker     Packs/day: 0.50     Years: 15.00     Pack years: 7.50     Start date:    • Smokeless tobacco: Never Used   • Tobacco comment:   ppd -present   Substance and Sexual Activity   • Alcohol use: Not Currently     Comment: 12/15/2018 stopped drinking  - sober living  --heavy use [id number], then 3-4 drinks per week.. Quit 12/15/19, a few relapses   • Drug use:  No     Comment: h/o Cocaine use   • Sexual activity: Yes     Partners: Male     Birth control/protection: Condom         There were no vitals taken for this visit.    PHYSICAL EXAM  Physical Exam   Constitutional: She appears well-developed and well-nourished.   HENT:   Head: Normocephalic.   Nose: Nose normal.   Neck: Neck normal appearance.  Pulmonary/Chest: Effort normal.   Neurological: She is alert.   Psychiatric: She has a normal mood and affect. Her speech is normal.       Diagnoses and all orders for this visit:    1. Suspected UTI (Primary)  -     nitrofurantoin, macrocrystal-monohydrate, (Macrobid) 100 MG capsule; Take 1 capsule by mouth 2 (Two) Times a Day for 7 days.  Dispense: 14 capsule; Refill: 0  -     phenazopyridine (Pyridium) 200 MG tablet; Take 1 tablet by mouth 3 (Three) Times a Day As Needed for Bladder Spasms for up to 2 days.  Dispense: 6 tablet; Refill: 0          FOLLOW-UP  As discussed during visit with [facility], if symptoms worsen or fail to improve, follow-up with PCP/Urgent Care/Emergency Department.    Patient verbalizes understanding of medications, instructions for treatment and follow-up.    Mary Keller, MAJO  06/15/2021  16:11 EDT    This visit was performed via Telehealth.  This patient has been instructed to follow-up with their primary care provider if their symptoms worsen or the treatment provided does not resolve their illness.

## 2021-06-15 NOTE — PATIENT INSTRUCTIONS
Urinary Tract Infection, Adult    A urinary tract infection (UTI) is an infection of any part of the urinary tract. The urinary tract includes the kidneys, ureters, bladder, and urethra. These organs make, store, and get rid of urine in the body.  Your health care provider may use other names to describe the infection. An upper UTI affects the ureters and kidneys (pyelonephritis). A lower UTI affects the bladder (cystitis) and urethra (urethritis).  What are the causes?  Most urinary tract infections are caused by bacteria in your genital area, around the entrance to your urinary tract (urethra). These bacteria grow and cause inflammation of your urinary tract.  What increases the risk?  You are more likely to develop this condition if:  · You have a urinary catheter that stays in place (indwelling).  · You are not able to control when you urinate or have a bowel movement (you have incontinence).  · You are female and you:  ? Use a spermicide or diaphragm for birth control.  ? Have low estrogen levels.  ? Are pregnant.  · You have certain genes that increase your risk (genetics).  · You are sexually active.  · You take antibiotic medicines.  · You have a condition that causes your flow of urine to slow down, such as:  ? An enlarged prostate, if you are male.  ? Blockage in your urethra (stricture).  ? A kidney stone.  ? A nerve condition that affects your bladder control (neurogenic bladder).  ? Not getting enough to drink, or not urinating often.  · You have certain medical conditions, such as:  ? Diabetes.  ? A weak disease-fighting system (immunesystem).  ? Sickle cell disease.  ? Gout.  ? Spinal cord injury.  What are the signs or symptoms?  Symptoms of this condition include:  · Needing to urinate right away (urgently).  · Frequent urination or passing small amounts of urine frequently.  · Pain or burning with urination.  · Blood in the urine.  · Urine that smells bad or unusual.  · Trouble urinating.  · Cloudy  urine.  · Vaginal discharge, if you are female.  · Pain in the abdomen or the lower back.  You may also have:  · Vomiting or a decreased appetite.  · Confusion.  · Irritability or tiredness.  · A fever.  · Diarrhea.  The first symptom in older adults may be confusion. In some cases, they may not have any symptoms until the infection has worsened.  How is this diagnosed?  This condition is diagnosed based on your medical history and a physical exam. You may also have other tests, including:  · Urine tests.  · Blood tests.  · Tests for sexually transmitted infections (STIs).  If you have had more than one UTI, a cystoscopy or imaging studies may be done to determine the cause of the infections.  How is this treated?  Treatment for this condition includes:  · Antibiotic medicine.  · Over-the-counter medicines to treat discomfort.  · Drinking enough water to stay hydrated.  If you have frequent infections or have other conditions such as a kidney stone, you may need to see a health care provider who specializes in the urinary tract (urologist).  In rare cases, urinary tract infections can cause sepsis. Sepsis is a life-threatening condition that occurs when the body responds to an infection. Sepsis is treated in the hospital with IV antibiotics, fluids, and other medicines.  Follow these instructions at home:    Medicines  · Take over-the-counter and prescription medicines only as told by your health care provider.  · If you were prescribed an antibiotic medicine, take it as told by your health care provider. Do not stop using the antibiotic even if you start to feel better.  General instructions  · Make sure you:  ? Empty your bladder often and completely. Do not hold urine for long periods of time.  ? Empty your bladder after sex.  ? Wipe from front to back after a bowel movement if you are female. Use each tissue one time when you wipe.  · Drink enough fluid to keep your urine pale yellow.  · Keep all follow-up  visits as told by your health care provider. This is important.  Contact a health care provider if:  · Your symptoms do not get better after 1-2 days.  · Your symptoms go away and then return.  Get help right away if you have:  · Severe pain in your back or your lower abdomen.  · A fever.  · Nausea or vomiting.  Summary  · A urinary tract infection (UTI) is an infection of any part of the urinary tract, which includes the kidneys, ureters, bladder, and urethra.  · Most urinary tract infections are caused by bacteria in your genital area, around the entrance to your urinary tract (urethra).  · Treatment for this condition often includes antibiotic medicines.  · If you were prescribed an antibiotic medicine, take it as told by your health care provider. Do not stop using the antibiotic even if you start to feel better.  · Keep all follow-up visits as told by your health care provider. This is important.  This information is not intended to replace advice given to you by your health care provider. Make sure you discuss any questions you have with your health care provider.  Document Revised: 12/05/2019 Document Reviewed: 06/27/2019  [employer] Patient Education © 2021 [employer]

## 2021-09-17 ENCOUNTER — LAB (OUTPATIENT)
Dept: INTERNAL MEDICINE | Facility: CLINIC | Age: 41
End: 2021-09-17

## 2021-09-17 DIAGNOSIS — Z72.51 HIGH RISK HETEROSEXUAL BEHAVIOR: ICD-10-CM

## 2021-09-17 DIAGNOSIS — R63.4 ABNORMAL WEIGHT LOSS: ICD-10-CM

## 2021-09-17 DIAGNOSIS — F41.1 GENERALIZED ANXIETY DISORDER: ICD-10-CM

## 2021-09-17 DIAGNOSIS — Z13.6 SCREENING FOR CARDIOVASCULAR CONDITION: ICD-10-CM

## 2021-09-17 DIAGNOSIS — F32.89 OTHER DEPRESSION: ICD-10-CM

## 2021-09-17 DIAGNOSIS — Z00.00 ENCOUNTER FOR HEALTH MAINTENANCE EXAMINATION IN ADULT: ICD-10-CM

## 2021-09-17 DIAGNOSIS — E53.8 VITAMIN B12 DEFICIENCY: ICD-10-CM

## 2021-09-17 LAB
25(OH)D3 SERPL-MCNC: 33.2 NG/ML (ref 30–100)
ALBUMIN SERPL-MCNC: 4.4 G/DL (ref 3.5–5.2)
ALBUMIN/GLOB SERPL: 1.9 G/DL
ALP SERPL-CCNC: 61 U/L (ref 39–117)
ALT SERPL W P-5'-P-CCNC: 13 U/L (ref 1–33)
ANION GAP SERPL CALCULATED.3IONS-SCNC: 8.3 MMOL/L (ref 5–15)
AST SERPL-CCNC: 22 U/L (ref 1–32)
BASOPHILS # BLD AUTO: 0.05 10*3/MM3 (ref 0–0.2)
BASOPHILS NFR BLD AUTO: 0.6 % (ref 0–1.5)
BILIRUB BLD-MCNC: NEGATIVE MG/DL
BILIRUB SERPL-MCNC: 0.2 MG/DL (ref 0–1.2)
BUN SERPL-MCNC: 10 MG/DL (ref 6–20)
BUN/CREAT SERPL: 14.9 (ref 7–25)
CALCIUM SPEC-SCNC: 9 MG/DL (ref 8.6–10.5)
CHLORIDE SERPL-SCNC: 102 MMOL/L (ref 98–107)
CHOLEST SERPL-MCNC: 144 MG/DL (ref 0–200)
CLARITY, POC: CLEAR
CO2 SERPL-SCNC: 26.7 MMOL/L (ref 22–29)
COLOR UR: YELLOW
CREAT SERPL-MCNC: 0.67 MG/DL (ref 0.57–1)
DEPRECATED RDW RBC AUTO: 43.2 FL (ref 37–54)
EOSINOPHIL # BLD AUTO: 0.19 10*3/MM3 (ref 0–0.4)
EOSINOPHIL NFR BLD AUTO: 2.2 % (ref 0.3–6.2)
ERYTHROCYTE [DISTWIDTH] IN BLOOD BY AUTOMATED COUNT: 12.8 % (ref 12.3–15.4)
EXPIRATION DATE: NORMAL
GFR SERPL CREATININE-BSD FRML MDRD: 98 ML/MIN/1.73
GLOBULIN UR ELPH-MCNC: 2.3 GM/DL
GLUCOSE SERPL-MCNC: 74 MG/DL (ref 65–99)
GLUCOSE UR STRIP-MCNC: NEGATIVE MG/DL
HCT VFR BLD AUTO: 36.5 % (ref 34–46.6)
HCV AB SER DONR QL: NORMAL
HDLC SERPL-MCNC: 75 MG/DL (ref 40–60)
HGB BLD-MCNC: 12.2 G/DL (ref 12–15.9)
HIV1+2 AB SER QL: NORMAL
IMM GRANULOCYTES # BLD AUTO: 0.02 10*3/MM3 (ref 0–0.05)
IMM GRANULOCYTES NFR BLD AUTO: 0.2 % (ref 0–0.5)
KETONES UR QL: NEGATIVE
LDLC SERPL CALC-MCNC: 57 MG/DL (ref 0–100)
LDLC/HDLC SERPL: 0.78 {RATIO}
LEUKOCYTE EST, POC: NEGATIVE
LYMPHOCYTES # BLD AUTO: 3.8 10*3/MM3 (ref 0.7–3.1)
LYMPHOCYTES NFR BLD AUTO: 44.2 % (ref 19.6–45.3)
Lab: NORMAL
MCH RBC QN AUTO: 31 PG (ref 26.6–33)
MCHC RBC AUTO-ENTMCNC: 33.4 G/DL (ref 31.5–35.7)
MCV RBC AUTO: 92.6 FL (ref 79–97)
MONOCYTES # BLD AUTO: 0.8 10*3/MM3 (ref 0.1–0.9)
MONOCYTES NFR BLD AUTO: 9.3 % (ref 5–12)
NEUTROPHILS NFR BLD AUTO: 3.73 10*3/MM3 (ref 1.7–7)
NEUTROPHILS NFR BLD AUTO: 43.5 % (ref 42.7–76)
NITRITE UR-MCNC: NEGATIVE MG/ML
NRBC BLD AUTO-RTO: 0 /100 WBC (ref 0–0.2)
PH UR: 7 [PH] (ref 5–8)
PLATELET # BLD AUTO: 260 10*3/MM3 (ref 140–450)
PMV BLD AUTO: 9.1 FL (ref 6–12)
POTASSIUM SERPL-SCNC: 3.9 MMOL/L (ref 3.5–5.2)
PROT SERPL-MCNC: 6.7 G/DL (ref 6–8.5)
PROT UR STRIP-MCNC: NEGATIVE MG/DL
RBC # BLD AUTO: 3.94 10*6/MM3 (ref 3.77–5.28)
RBC # UR STRIP: NEGATIVE /UL
SODIUM SERPL-SCNC: 137 MMOL/L (ref 136–145)
SP GR UR: 1.01 (ref 1–1.03)
T4 FREE SERPL-MCNC: 0.93 NG/DL (ref 0.93–1.7)
TRIGL SERPL-MCNC: 54 MG/DL (ref 0–150)
TSH SERPL DL<=0.05 MIU/L-ACNC: 1.41 UIU/ML (ref 0.27–4.2)
UROBILINOGEN UR QL: NORMAL
VIT B12 BLD-MCNC: 356 PG/ML (ref 211–946)
VLDLC SERPL-MCNC: 12 MG/DL (ref 5–40)
WBC # BLD AUTO: 8.59 10*3/MM3 (ref 3.4–10.8)

## 2021-09-17 PROCEDURE — 81003 URINALYSIS AUTO W/O SCOPE: CPT | Performed by: INTERNAL MEDICINE

## 2021-09-17 PROCEDURE — 82607 VITAMIN B-12: CPT | Performed by: INTERNAL MEDICINE

## 2021-09-17 PROCEDURE — 86803 HEPATITIS C AB TEST: CPT | Performed by: INTERNAL MEDICINE

## 2021-09-17 PROCEDURE — 80050 GENERAL HEALTH PANEL: CPT | Performed by: INTERNAL MEDICINE

## 2021-09-17 PROCEDURE — 84439 ASSAY OF FREE THYROXINE: CPT | Performed by: INTERNAL MEDICINE

## 2021-09-17 PROCEDURE — 80061 LIPID PANEL: CPT | Performed by: INTERNAL MEDICINE

## 2021-09-17 PROCEDURE — G0432 EIA HIV-1/HIV-2 SCREEN: HCPCS | Performed by: INTERNAL MEDICINE

## 2021-09-17 PROCEDURE — 82306 VITAMIN D 25 HYDROXY: CPT | Performed by: INTERNAL MEDICINE

## 2022-01-06 PROCEDURE — 87661 TRICHOMONAS VAGINALIS AMPLIF: CPT | Performed by: NURSE PRACTITIONER

## 2022-01-06 PROCEDURE — U0004 COV-19 TEST NON-CDC HGH THRU: HCPCS | Performed by: NURSE PRACTITIONER

## 2022-01-06 PROCEDURE — 87591 N.GONORRHOEAE DNA AMP PROB: CPT | Performed by: NURSE PRACTITIONER

## 2022-01-06 PROCEDURE — 87491 CHLMYD TRACH DNA AMP PROBE: CPT | Performed by: NURSE PRACTITIONER

## 2022-03-14 ENCOUNTER — TELEMEDICINE (OUTPATIENT)
Dept: FAMILY MEDICINE CLINIC | Facility: TELEHEALTH | Age: 42
End: 2022-03-14

## 2022-03-14 DIAGNOSIS — R39.89 SUSPECTED UTI: Primary | ICD-10-CM

## 2022-03-14 PROCEDURE — 99213 OFFICE O/P EST LOW 20 MIN: CPT | Performed by: NURSE PRACTITIONER

## 2022-03-14 RX ORDER — PHENAZOPYRIDINE HYDROCHLORIDE 200 MG/1
200 TABLET, FILM COATED ORAL 3 TIMES DAILY PRN
Qty: 6 TABLET | Refills: 0 | Status: SHIPPED | OUTPATIENT
Start: 2022-03-14 | End: 2022-03-16

## 2022-03-14 RX ORDER — NITROFURANTOIN 25; 75 MG/1; MG/1
100 CAPSULE ORAL 2 TIMES DAILY
Qty: 14 CAPSULE | Refills: 0 | Status: SHIPPED | OUTPATIENT
Start: 2022-03-14 | End: 2022-03-21

## 2022-03-14 NOTE — PROGRESS NOTES
You have chosen to receive care through a telehealth visit.  Do you consent to use a video/audio connection for your medical care today? Yes     CHIEF COMPLAINT  No chief complaint on file.        [facility]  Lisa Grover is a 41 y.o. female  presents with complaint of 1 day history of dysuria, frequency, urgency.  Denies fever/chills, n/v, belly/back pain, hematuria or vaginal symptoms.  Reports getting UTIs once/twice a year and they resolve with antibiotic treatment    Review of Systems   Constitutional: Negative for fever.   Gastrointestinal: Negative for abdominal pain.   Genitourinary: Positive for dysuria, frequency and urgency. Negative for flank pain and hematuria.   Musculoskeletal: Negative for back pain.   All other systems reviewed and are negative.      Past Medical History:   Diagnosis Date   • Anxiety    • Cervical dysplasia     s/p cryotherapy   • Depression    • Gastroesophageal reflux disease    • Heart murmur At birth.    Resolved on its own   • History of blood transfusion 2017    2 units   • History of spontaneous  2013    no D&C   • Substance abuse (HCC)    • Urinary tract infection        Family History   Problem Relation Age of Onset   • Alcohol abuse Father    • Other Father         Prostate issues   • Hearing loss Maternal Grandfather    • COPD Mother    • Depression Mother    • Anxiety disorder Mother    • Rheum arthritis Mother    • Depression Maternal Grandmother    • Anxiety disorder Maternal Grandmother    • No Known Problems Paternal Grandmother    • Lung cancer Paternal Grandfather    • Depression Half-Sister    • Anxiety disorder Half-Sister    • Dementia Maternal Aunt        Social History     Socioeconomic History   • Marital status: Single   • Number of children: 1   Tobacco Use   • Smoking status: Current Every Day Smoker     Packs/day: 0.50     Years: 15.00     Pack years: 7.50     Start date:    • Smokeless tobacco: Never Used   • Tobacco comment:  1/2  ppd 2000-present   Vaping Use   • Vaping Use: Never used   Substance and Sexual Activity   • Alcohol use: Not Currently     Comment: 12/15/2018 stopped drinking  - sober living  --heavy use [id number], then 3-4 drinks per week.. Quit 12/15/19, a few relapses   • Drug use: No     Comment: h/o Cocaine use   • Sexual activity: Yes     Partners: Male     Birth control/protection: Condom         There were no vitals taken for this visit.    PHYSICAL EXAM  Physical Exam   Constitutional: She is oriented to person, place, and time. She appears well-developed and well-nourished. She does not have a sickly appearance. She does not appear ill.   HENT:   Head: Normocephalic and atraumatic.   Pulmonary/Chest: Effort normal.  No respiratory distress.  Neurological: She is alert and oriented to person, place, and time.         Diagnoses and all orders for this visit:    1. Suspected UTI (Primary)  -     nitrofurantoin, macrocrystal-monohydrate, (MACROBID) 100 MG capsule; Take 1 capsule by mouth 2 (Two) Times a Day for 7 days.  Dispense: 14 capsule; Refill: 0  -     phenazopyridine (PYRIDIUM) 200 MG tablet; Take 1 tablet by mouth 3 (Three) Times a Day As Needed for Bladder Spasms for up to 2 days.  Dispense: 6 tablet; Refill: 0    -Macrobid as prescribed - complete entire course of medication even if you begin to feel better.   --Phenazopyridine is for painful urination and bladder spasms--this medication with cause urine to become bright orange and can stain undergarments.    -Continue to increase your fluid intake.   -Abstain from intercourse during antibiotic treatment.   -Practice good perineal hygiene: wipe front to back  -Do not hold your urine- go to the bathroom every 2-3 hours.     -Warning signs: severe abdominal/pelvic/back pain, fever >101, blood in urine - seek medical attention as soon as possible for a hands on/objective exam and possible labs.     -Follow up with your PCP in 2 days if no improvement in symptoms  or if symptoms begin to worsen.         FOLLOW-UP  As discussed during visit with PCP/[facility] if no improvement or Urgent Care/Emergency Department if worsening of symptoms    Patient verbalizes understanding of medication dosage, comfort measures, instructions for treatment and follow-up.    MAJO Baird  03/14/2022  15:22 EDT    This visit was performed via Telehealth.  This patient has been instructed to follow-up with their primary care provider if their symptoms worsen or the treatment provided does not resolve their illness.

## 2022-03-14 NOTE — PATIENT INSTRUCTIONS
Urinary Tract Infection, Adult    A urinary tract infection (UTI) is an infection of any part of the urinary tract. The urinary tract includes the kidneys, ureters, bladder, and urethra. These organs make, store, and get rid of urine in the body.  Your health care provider may use other names to describe the infection. An upper UTI affects the ureters and kidneys (pyelonephritis). A lower UTI affects the bladder (cystitis) and urethra (urethritis).  What are the causes?  Most urinary tract infections are caused by bacteria in your genital area, around the entrance to your urinary tract (urethra). These bacteria grow and cause inflammation of your urinary tract.  What increases the risk?  You are more likely to develop this condition if:  You have a urinary catheter that stays in place (indwelling).  You are not able to control when you urinate or have a bowel movement (you have incontinence).  You are female and you:  Use a spermicide or diaphragm for birth control.  Have low estrogen levels.  Are pregnant.  You have certain genes that increase your risk (genetics).  You are sexually active.  You take antibiotic medicines.  You have a condition that causes your flow of urine to slow down, such as:  An enlarged prostate, if you are male.  Blockage in your urethra (stricture).  A kidney stone.  A nerve condition that affects your bladder control (neurogenic bladder).  Not getting enough to drink, or not urinating often.  You have certain medical conditions, such as:  Diabetes.  A weak disease-fighting system (immunesystem).  Sickle cell disease.  Gout.  Spinal cord injury.  What are the signs or symptoms?  Symptoms of this condition include:  Needing to urinate right away (urgently).  Frequent urination or passing small amounts of urine frequently.  Pain or burning with urination.  Blood in the urine.  Urine that smells bad or unusual.  Trouble urinating.  Cloudy urine.  Vaginal discharge, if you are female.  Pain in  the abdomen or the lower back.  You may also have:  Vomiting or a decreased appetite.  Confusion.  Irritability or tiredness.  A fever.  Diarrhea.  The first symptom in older adults may be confusion. In some cases, they may not have any symptoms until the infection has worsened.  How is this diagnosed?  This condition is diagnosed based on your medical history and a physical exam. You may also have other tests, including:  Urine tests.  Blood tests.  Tests for sexually transmitted infections (STIs).  If you have had more than one UTI, a cystoscopy or imaging studies may be done to determine the cause of the infections.  How is this treated?  Treatment for this condition includes:  Antibiotic medicine.  Over-the-counter medicines to treat discomfort.  Drinking enough water to stay hydrated.  If you have frequent infections or have other conditions such as a kidney stone, you may need to see a health care provider who specializes in the urinary tract (urologist).  In rare cases, urinary tract infections can cause sepsis. Sepsis is a life-threatening condition that occurs when the body responds to an infection. Sepsis is treated in the hospital with IV antibiotics, fluids, and other medicines.  Follow these instructions at home:    Medicines  Take over-the-counter and prescription medicines only as told by your health care provider.  If you were prescribed an antibiotic medicine, take it as told by your health care provider. Do not stop using the antibiotic even if you start to feel better.  General instructions  Make sure you:  Empty your bladder often and completely. Do not hold urine for long periods of time.  Empty your bladder after sex.  Wipe from front to back after a bowel movement if you are female. Use each tissue one time when you wipe.  Drink enough fluid to keep your urine pale yellow.  Keep all follow-up visits as told by your health care provider. This is important.  Contact a health care provider  if:  Your symptoms do not get better after 1-2 days.  Your symptoms go away and then return.  Get help right away if you have:  Severe pain in your back or your lower abdomen.  A fever.  Nausea or vomiting.  Summary  A urinary tract infection (UTI) is an infection of any part of the urinary tract, which includes the kidneys, ureters, bladder, and urethra.  Most urinary tract infections are caused by bacteria in your genital area, around the entrance to your urinary tract (urethra).  Treatment for this condition often includes antibiotic medicines.  If you were prescribed an antibiotic medicine, take it as told by your health care provider. Do not stop using the antibiotic even if you start to feel better.  Keep all follow-up visits as told by your health care provider. This is important.  This information is not intended to replace advice given to you by your health care provider. Make sure you discuss any questions you have with your health care provider.  Document Revised: 12/05/2019 Document Reviewed: 06/27/2019  [employer] Patient Education © 2021 [employer]

## 2022-07-22 ENCOUNTER — TELEPHONE (OUTPATIENT)
Dept: INTERNAL MEDICINE | Facility: CLINIC | Age: 42
End: 2022-07-22

## 2022-07-22 RX ORDER — VENLAFAXINE HYDROCHLORIDE 150 MG/1
150 CAPSULE, EXTENDED RELEASE ORAL DAILY
Qty: 30 CAPSULE | Refills: 0 | Status: SHIPPED | OUTPATIENT
Start: 2022-07-22 | End: 2022-09-01 | Stop reason: SDUPTHER

## 2022-07-22 RX ORDER — VENLAFAXINE 75 MG/1
75 TABLET ORAL DAILY
Qty: 30 TABLET | Refills: 0 | Status: SHIPPED | OUTPATIENT
Start: 2022-07-22 | End: 2022-11-30

## 2022-07-22 NOTE — TELEPHONE ENCOUNTER
Last seen 6/7/21, patient states she is completely out, wants to know if she can get 1 month and she'll schedule appt

## 2022-07-22 NOTE — TELEPHONE ENCOUNTER
Please confirm that she is on both doses of the Effexor.  Then okay to send 1 month Rx  to the pharmacy.  Please schedule the patient for a physical, fasting.

## 2022-07-22 NOTE — TELEPHONE ENCOUNTER
Caller: Lisa Grover    Relationship: Self    Best call back number: [phone]    Requested Prescriptions:       venlafaxine XR (EFFEXOR-XR) 150 MG 24 hr capsule     venlafaxine (EFFEXOR) 75 MG tablet         [facility] be [facility] [address] [place] [address] [facility] [phone] [facility] [phone] FX      Additional details provided by patient:   Does the patient have less than a 3 day supply:  [x] Yes  [] No    Gaurang Medina Rep   07/22/22 11:48 EDT

## 2022-09-01 ENCOUNTER — E-VISIT (OUTPATIENT)
Dept: ADMINISTRATIVE | Facility: OTHER | Age: 42
End: 2022-09-01

## 2022-09-01 ENCOUNTER — TELEMEDICINE (OUTPATIENT)
Dept: FAMILY MEDICINE CLINIC | Facility: TELEHEALTH | Age: 42
End: 2022-09-01

## 2022-09-01 DIAGNOSIS — F41.9 ANXIETY AND DEPRESSION: ICD-10-CM

## 2022-09-01 DIAGNOSIS — F32.A ANXIETY AND DEPRESSION: ICD-10-CM

## 2022-09-01 DIAGNOSIS — K04.7 DENTAL ABSCESS: Primary | ICD-10-CM

## 2022-09-01 DIAGNOSIS — Z76.0 MEDICATION REFILL: ICD-10-CM

## 2022-09-01 PROCEDURE — 99213 OFFICE O/P EST LOW 20 MIN: CPT | Performed by: NURSE PRACTITIONER

## 2022-09-01 RX ORDER — AMOXICILLIN AND CLAVULANATE POTASSIUM 875; 125 MG/1; MG/1
1 TABLET, FILM COATED ORAL 2 TIMES DAILY
Qty: 20 TABLET | Refills: 0 | Status: SHIPPED | OUTPATIENT
Start: 2022-09-01 | End: 2022-09-11

## 2022-09-01 RX ORDER — VENLAFAXINE HYDROCHLORIDE 225 MG/1
225 TABLET, EXTENDED RELEASE ORAL
Qty: 14 EACH | Refills: 0 | Status: SHIPPED | OUTPATIENT
Start: 2022-09-01 | End: 2022-11-30 | Stop reason: ALTCHOICE

## 2022-09-01 NOTE — PROGRESS NOTES
"Subjective   Lisa Grover is a 41 y.o. female.     Presents with c/o \"tootheache and medication refill.\" She is switching dentists, she is trying to get [facility] because [facility] medicaid. She was told by her old dentist that she needing a root canal. She has been on amoxicillin for two weeks. And off of it for a week while awaiting to get into the new dentist. It is her back left top molar second from the back. She is having some pain and drainage coming from the area which had stopped while she was on antibiotics. Denies fever or swelling.     She is out of venlefaxine 225 mg tablet which she has been on for 3-4 years, she ran out of it today. She has an appointment set up with primary care Tuesday of next week. She also has a psych NP she works with.        The following portions of the patient's history were reviewed and updated as appropriate: allergies, current medications, past family history, past medical history, past social history, past surgical history, and problem list.    Review of Systems   Constitutional: Negative.    HENT: Positive for dental problem.    Respiratory: Negative.    Gastrointestinal: Negative.    Psychiatric/Behavioral: Positive for depressed mood.       Objective   Physical Exam  Constitutional:       General: She is not in acute distress.     Appearance: She is well-developed. She is not diaphoretic.   Pulmonary:      Effort: Pulmonary effort is normal.   Neurological:      Mental Status: She is alert and oriented to person, place, and time.   Psychiatric:         Behavior: Behavior normal.           Assessment & Plan   Diagnoses and all orders for this visit:    1. Dental abscess (Primary)  -     amoxicillin-clavulanate (Augmentin) 875-125 MG per tablet; Take 1 tablet by mouth 2 (Two) Times a Day for 10 days.  Dispense: 20 tablet; Refill: 0    2. Medication refill  -     venlafaxine 225 MG tablet sustained-release 24 hour 24 hr tablet; Take 1 tablet " by mouth Daily With Breakfast.  Dispense: 14 each; Refill: 0    3. Anxiety and depression  -     venlafaxine 225 MG tablet sustained-release 24 hour 24 hr tablet; Take 1 tablet by mouth Daily With Breakfast.  Dispense: 14 each; Refill: 0                 The use of a video visit has been reviewed with the patient and verbal informed consent has been obtained. Myself and Lisa Grover participated in this visit. The patient is located in [place], [place]. I am located in [place], [place]. Mychart and Zoom were utilized. I spent 20 minutes in the patient's chart for this visit.

## 2022-09-01 NOTE — E-VISIT ESCALATED
Chief Complaint: Mouth sores   Patient was shown the following escalation message:   Some conditions need a visit with a healthcare provider   We need to see photos of your mouth to provide the best treatment. Since you'd rather not send photos, you should speak with a provider to get care.   ----------   Patient Interview Transcript:   Are your symptoms on the inside or the outside of your mouth? - Inside includes your tongue, inner cheek or lips, gums, or roof of mouth - Outside includes your lips, skin surrounding lips, or corners of mouth Select all that apply.    Inside   Not selected:    Outside   Which areas inside your mouth are affected? Select all that apply.    Gums   Not selected:    Tongue    Inner cheeks or inner lips    Roof of my mouth    Floor of my mouth (below the tongue)    Throat   Which of these best describe the symptoms inside your mouth? Scroll to see all options. Select all that apply.    Pain    Swelling    Cracking   Not selected:    Painful sores (or ulcers)    Painless sores (or ulcers)    Bumps or lumps    White patches    Red patches    Blisters filled with fluid   How long have you had these symptoms? Select one.    4 to 6 days   Not selected:    1 to 3 days    7 to 10 days    More than 10 days   Do you have any of these in the affected area of your mouth? Select all that apply.    None of the above   Not selected:    Itching    Burning    Pain or tenderness    A cottony feeling    Loss of taste   How many mouth lesions, sores, or bumps do you have? Select one.    1   Not selected:    2 to 4    5 to 10    More than 10   Mouth sores can interfere with talking, eating, or drinking. How much does the mouth sore affect your ability to do these things? Select one.    Somewhat, but I can still talk, eat, and drink   Not selected:    Not at all    It makes talking, eating, or drinking impossible   Is there any swelling, redness, or warmth on your cheek or any other part of your face (not  including the mouth area)? Select one.    I'm not sure   Not selected:    Yes    No   Ready to send photos? If you choose not to send photos, you may need to speak to a provider to get care. Select one.    No, I'd rather not send photos   Not selected:    OK, I'll send photos   ----------   Medical history   Medical history data does not currently exist for this patient.

## 2022-09-12 ENCOUNTER — TELEPHONE (OUTPATIENT)
Dept: INTERNAL MEDICINE | Facility: CLINIC | Age: 42
End: 2022-09-12

## 2022-09-12 NOTE — TELEPHONE ENCOUNTER
LOV for chronic condition 6/7/2021  NOV   Past due for fasting physical 6/7/2022    HUBB please read  Needs to scheduled fasting physical  Detailed message left on voicemail   Needs to schedule appointment

## 2022-09-13 NOTE — TELEPHONE ENCOUNTER
Spoke to patient and advised of message.   Patient scheduled fasting physical on 09/16/2022 at 10:30 am    Patient verbalized good understanding.

## 2022-09-19 ENCOUNTER — TELEPHONE (OUTPATIENT)
Dept: INTERNAL MEDICINE | Facility: CLINIC | Age: 42
End: 2022-09-19

## 2022-09-19 RX ORDER — VENLAFAXINE 75 MG/1
75 TABLET ORAL DAILY
Qty: 30 TABLET | Refills: 0 | OUTPATIENT
Start: 2022-09-19

## 2022-11-30 ENCOUNTER — TELEMEDICINE (OUTPATIENT)
Dept: FAMILY MEDICINE CLINIC | Facility: TELEHEALTH | Age: 42
End: 2022-11-30

## 2022-11-30 DIAGNOSIS — J06.9 ACUTE URI: Primary | ICD-10-CM

## 2022-11-30 PROCEDURE — 99213 OFFICE O/P EST LOW 20 MIN: CPT | Performed by: NURSE PRACTITIONER

## 2022-11-30 RX ORDER — GUAIFENESIN, PSEUDOEPHEDRINE HYDROCHLORIDE 600; 60 MG/1; MG/1
1 TABLET, EXTENDED RELEASE ORAL EVERY 12 HOURS
Qty: 20 TABLET | Refills: 0 | Status: SHIPPED | OUTPATIENT
Start: 2022-11-30 | End: 2022-12-10

## 2022-11-30 RX ORDER — DEXTROMETHORPHAN HYDROBROMIDE AND PROMETHAZINE HYDROCHLORIDE 15; 6.25 MG/5ML; MG/5ML
5 SYRUP ORAL 4 TIMES DAILY PRN
Qty: 120 ML | Refills: 0 | Status: SHIPPED | OUTPATIENT
Start: 2022-11-30 | End: 2022-12-10

## 2022-11-30 NOTE — PROGRESS NOTES
Chief Complaint   Patient presents with   • Nasal Congestion   • Generalized Body Aches   • Nausea       Subjective   Lisa Grover is a 42 y.o. female.     Nausea  This is a new problem. The problem has been resolved. Associated symptoms include congestion, coughing, fatigue, a fever, myalgias and nausea. Pertinent negatives include no chest pain, sore throat or vomiting. The symptoms are aggravated by coughing.   URI   This is a new problem. Episode onset: 4-5 days. The problem has been waxing and waning. There has been no fever. Associated symptoms include congestion, coughing, nausea and rhinorrhea. Pertinent negatives include no chest pain, ear pain, sore throat, vomiting or wheezing. She has tried antihistamine and decongestant for the symptoms. The treatment provided no relief.       The following portions of the patient's history were reviewed and updated as appropriate: allergies, current medications, past medical history, and problem list.      Past Medical History:   Diagnosis Date   • Anxiety    • Cervical dysplasia     s/p cryotherapy   • Depression    • Gastroesophageal reflux disease    • Heart murmur At birth.    Resolved on its own   • History of blood transfusion 2017    2 units   • History of spontaneous  2013    no D&C   • Substance abuse (HCC)    • Urinary tract infection      Social History     Socioeconomic History   • Marital status: Single   • Number of children: 1   Tobacco Use   • Smoking status: Every Day     Packs/day: 0.50     Years: 15.00     Pack years: 7.50     Types: Cigarettes     Start date:    • Smokeless tobacco: Never   • Tobacco comments:       ppd -present   Vaping Use   • Vaping Use: Never used   Substance and Sexual Activity   • Alcohol use: Not Currently     Comment: 12/15/2018 stopped drinking  - sober living  --heavy use [id number], then 3-4 drinks per week.. Quit 12/15/19, a few relapses   • Drug use: No     Comment: h/o Cocaine  use   • Sexual activity: Yes     Partners: Male     Birth control/protection: Condom     medication documentation: reviewed and updated with patient and   Current Outpatient Medications:   •  doxepin (SINEquan) 50 MG capsule, As Needed., Disp: , Rfl:   •  hydrOXYzine (ATARAX) 10 MG tablet, Take 1 tablet by mouth As Needed for Anxiety. PRN anxiety; For: Anxiety disorder, Depression, Disp: , Rfl:   •  promethazine-dextromethorphan (PROMETHAZINE-DM) 6.25-15 MG/5ML syrup, Take 5 mL by mouth 4 (Four) Times a Day As Needed for Cough for up to 10 days., Disp: 120 mL, Rfl: 0  •  propranolol (INDERAL) 10 MG tablet, Take 1 tablet by mouth Daily As Needed (prn)., Disp: 90 tablet, Rfl: 3  •  pseudoephedrine-guaifenesin (MUCINEX D)  MG per 12 hr tablet, Take 1 tablet by mouth Every 12 (Twelve) Hours for 10 days., Disp: 20 tablet, Rfl: 0  Review of Systems   Constitutional: Positive for fatigue and fever.   HENT: Positive for congestion, postnasal drip, rhinorrhea, sinus pressure and voice change. Negative for ear pain, facial swelling and sore throat.    Respiratory: Positive for cough. Negative for chest tightness, shortness of breath and wheezing.    Cardiovascular: Negative for chest pain.   Gastrointestinal: Positive for nausea. Negative for vomiting.   Musculoskeletal: Positive for myalgias.       Objective   Physical Exam  Constitutional:       General: She is not in acute distress.     Appearance: She is not ill-appearing.   HENT:      Nose: Congestion present.   Eyes:      Conjunctiva/sclera: Conjunctivae normal.   Pulmonary:      Effort: Pulmonary effort is normal.      Comments: Infrequent cough, hoarseness  Neurological:      Mental Status: She is alert.   Psychiatric:         Speech: Speech normal.         Assessment & Plan   Diagnoses and all orders for this visit:    1. Acute URI (Primary)  -     promethazine-dextromethorphan (PROMETHAZINE-DM) 6.25-15 MG/5ML syrup; Take 5 mL by mouth 4 (Four) Times a Day As  Needed for Cough for up to 10 days.  Dispense: 120 mL; Refill: 0  -     pseudoephedrine-guaifenesin (MUCINEX D)  MG per 12 hr tablet; Take 1 tablet by mouth Every 12 (Twelve) Hours for 10 days.  Dispense: 20 tablet; Refill: 0                     Follow Up:  If your symptoms are not resolving by the completion of your treatment or are worsening, see your primary care provider for follow up. If you don't have a primary care provider, you may go to any Urgent Care for re-evaluation. If you develop any life threatening symptoms, go to the nearest Emergency Department immediately or call EMS.         Lisa Grover  reports that she has been smoking. She started smoking about 22 years ago. She has a 7.50 pack-year smoking history. She has never used smokeless tobacco.. I have educated her on the risk of diseases from using tobacco products such as cancer, COPD and heart disease. I advised her to quit and she is not willing to quit.I spent 3  minutes counseling the patient.            The use of  Video Visit was utilized during this visit, using both MyChart and Zoom. The use of a video visit has been reviewed with the patient and verbal informed consent has been obtained. No technical difficulties occurred during the visit.    is located at [address] [place] [id number]  Provider is located at [place], [place]

## 2022-12-27 ENCOUNTER — TELEPHONE (OUTPATIENT)
Dept: INTERNAL MEDICINE | Facility: CLINIC | Age: 42
End: 2022-12-27

## 2022-12-27 NOTE — TELEPHONE ENCOUNTER
Please review the information with the patient below.    Please let the patient know there are side effects of Paxilovid which could possibly include skin rash, hives, hoarseness, throat tightness, swelling of the mouth or lips, trouble swallowing,  Other side effects could include a change in taste, diarrhea, high blood pressure, muscle pain.  Paxilovid can affect your liver.  She should let us know if she develops any yellowing of the skin or darkened urine.      There is also concern regarding rebounding symptoms with use of Paxilovid, with a worsening of symptoms.     This medication should be given within the first 5 days of symptoms.    If agreeable I will have the medication sent to her pharmacy.

## 2022-12-27 NOTE — TELEPHONE ENCOUNTER
Caller: Lisa Grover    Relationship to patient: Self    Best call back number: [phone]    Date of exposure: N/A    Date of positive COVID19 test: TODAY    Date if possible COVID19 exposure: N/A    COVID19 symptoms: COUGHING UP GREENISH MUCUS OUT OF CHEST; FATIGUE; ACHY    Date of initial quarantine: N/A    Additional information or concerns: PATIENT STATED THAT SHE JUST TESTED POSITIVE FOR COVID THIS MORNING AND WANTED TO SEE IF SHE IS ELIGIBLE FOR MEDICATION    PLEASE ADVISE    What is the patients preferred pharmacy: [facility] [id number]

## 2022-12-28 NOTE — TELEPHONE ENCOUNTER
Patient called the office and was given the message about side effects of Paxilovid She is feeling worse today and wants to have the prescription called in to her pharmacy. She will call our office if she has any of the side effects listed.

## 2022-12-29 ENCOUNTER — TELEPHONE (OUTPATIENT)
Dept: INTERNAL MEDICINE | Facility: CLINIC | Age: 42
End: 2022-12-29

## 2022-12-29 NOTE — TELEPHONE ENCOUNTER
PATIENT HAS CALLED AND STATED SHE TESTED POSITIVE FOR COVID 12/27/22. PATIENT IS TAKING PAXLOVID. PATIENT STATES HE IS EXPERIENCING HEAVY CONGESTION. PATIENT STATES SHE ABOUT HAD A PANIC ATTACK. PATIENT STATES OXYGEN LEVEL IS FINE AND IS REQUESTING A CALL BACK ASAP TO ADVISE ON WHAT CAN BE DONE ABOUT HER CONGESTION.    CALL BACK NUMBER [phone]

## 2022-12-29 NOTE — TELEPHONE ENCOUNTER
I recommend Mucinex and Delsym over-the-counter for congestion and cough.  May take Tylenol or Ibuprofen for fever, muscle aches, and joint aches.  Make sure she is drinking plenty of fluid.

## 2023-11-06 ENCOUNTER — OFFICE VISIT (OUTPATIENT)
Dept: INTERNAL MEDICINE | Facility: CLINIC | Age: 43
End: 2023-11-06
Payer: COMMERCIAL

## 2023-11-06 VITALS
HEART RATE: 74 BPM | BODY MASS INDEX: 26.09 KG/M2 | SYSTOLIC BLOOD PRESSURE: 126 MMHG | WEIGHT: 152 LBS | RESPIRATION RATE: 16 BRPM | DIASTOLIC BLOOD PRESSURE: 78 MMHG | TEMPERATURE: 98 F

## 2023-11-06 DIAGNOSIS — J06.9 UPPER RESPIRATORY TRACT INFECTION, UNSPECIFIED TYPE: ICD-10-CM

## 2023-11-06 DIAGNOSIS — R09.81 NASAL CONGESTION: ICD-10-CM

## 2023-11-06 DIAGNOSIS — U07.1 COVID-19 VIRUS INFECTION: Primary | ICD-10-CM

## 2023-11-06 LAB
EXPIRATION DATE: NORMAL
FLUAV AG UPPER RESP QL IA.RAPID: NOT DETECTED
FLUBV AG UPPER RESP QL IA.RAPID: NOT DETECTED
INTERNAL CONTROL: NORMAL
Lab: NORMAL
SARS-COV-2 AG UPPER RESP QL IA.RAPID: NOT DETECTED

## 2023-11-06 PROCEDURE — 99214 OFFICE O/P EST MOD 30 MIN: CPT | Performed by: INTERNAL MEDICINE

## 2023-11-06 PROCEDURE — 87428 SARSCOV & INF VIR A&B AG IA: CPT | Performed by: INTERNAL MEDICINE

## 2023-11-06 RX ORDER — AZITHROMYCIN 250 MG/1
TABLET, FILM COATED ORAL
Qty: 6 TABLET | Refills: 0 | Status: SHIPPED | OUTPATIENT
Start: 2023-11-06

## 2023-11-06 NOTE — PROGRESS NOTES
Subjective       Lisa Grover is a 43 y.o. female.     Chief Complaint   Patient presents with    Cough    Nasal Congestion    Abdominal Pain     Left Side       History obtained from the patient.      URI   This is a new problem. Episode onset: 6 days ago. The problem has been waxing and waning. There has been no fever. Associated symptoms include abdominal pain, congestion, coughing (productive yellow/green), joint pain, joint swelling (knees, not new), rhinorrhea (yellow) and a sore throat (initially, resolved). Pertinent negatives include no chest pain, diarrhea, dysuria, ear pain, headaches, nausea, neck pain, rash, sinus pain, sneezing, swollen glands, vomiting or wheezing. Treatments tried: Albuterol inhaler and Mucinex DM. The treatment provided mild relief.   Abdominal Pain  This is a new problem. Episode onset: 3 days ago. The onset quality is sudden. The problem occurs intermittently. The most recent episode lasted 1 hour. The problem has been unchanged. The pain is located in the LLQ. The pain is mild. The quality of the pain is aching. The abdominal pain radiates to the left flank. Associated symptoms include arthralgias and myalgias. Pertinent negatives include no constipation, diarrhea, dysuria, fever, frequency, headaches, hematochezia, hematuria, melena, nausea or vomiting. Exacerbated by: eating heavy and greasy food. The pain is relieved by Nothing. She has tried nothing for the symptoms. Prior workup: None. Her past medical history is significant for GERD.      The patient states she had a positive home COVID test on 11/2/2023.  She states she has not had alcohol since August 2023.    The following portions of the patient's history were reviewed and updated as appropriate: allergies, current medications, past family history, past medical history, past social history, past surgical history, and problem list.      Review of Systems   Constitutional:  Positive for chills and fatigue.  Negative for fever.   HENT:  Positive for congestion, postnasal drip, rhinorrhea (yellow), sore throat (initially, resolved) and voice change (hoarseness). Negative for ear pain, sinus pressure, sinus pain and sneezing.    Respiratory:  Positive for cough (productive yellow/green). Negative for shortness of breath and wheezing.    Cardiovascular:  Negative for chest pain.   Gastrointestinal:  Positive for abdominal pain. Negative for constipation, diarrhea, hematochezia, melena, nausea and vomiting.   Genitourinary:  Positive for flank pain. Negative for dysuria, frequency, hematuria, pelvic pain, urgency, vaginal bleeding and vaginal discharge.   Musculoskeletal:  Positive for arthralgias, joint pain and myalgias. Negative for neck pain and neck stiffness.   Skin:  Negative for rash.   Neurological:  Negative for headaches.   Hematological:  Negative for adenopathy.           Objective     Blood pressure 126/78, pulse 74, temperature 98 °F (36.7 °C), temperature source Infrared, resp. rate 16, weight 68.9 kg (152 lb).    Physical Exam  Vitals and nursing note reviewed.   Constitutional:       Appearance: She is well-developed.      Comments: BMI greater than 25.   HENT:      Head: Normocephalic and atraumatic.      Comments: No maxillary or frontal sinus tenderness to palpation.     Right Ear: Tympanic membrane, ear canal and external ear normal.      Left Ear: Tympanic membrane, ear canal and external ear normal.      Mouth/Throat:      Mouth: Mucous membranes are moist. No oral lesions.      Pharynx: Oropharynx is clear.      Comments: Tonsils normal.  Eyes:      Conjunctiva/sclera: Conjunctivae normal.   Cardiovascular:      Rate and Rhythm: Normal rate and regular rhythm.      Heart sounds: Normal heart sounds. No murmur heard.  Pulmonary:      Effort: Pulmonary effort is normal.      Breath sounds: Normal breath sounds.   Musculoskeletal:      Cervical back: Normal range of motion and neck supple.    Lymphadenopathy:      Cervical: No cervical adenopathy.   Skin:     Findings: No rash.   Neurological:      Mental Status: She is alert.   Psychiatric:         Mood and Affect: Mood normal.         Results for orders placed or performed in visit on 11/06/23   POCT SARS-CoV-2 + Flu Antigen VIK    Specimen: Swab   Result Value Ref Range    SARS Antigen Not Detected Not Detected, Presumptive Negative    Influenza A Antigen VIK Not Detected Not Detected    Influenza B Antigen VIK Not Detected Not Detected    Internal Control Passed Passed    Lot Number [id number]     Expiration Date 10/27/24        Assessment & Plan   Diagnoses and all orders for this visit:    1. COVID-19 virus infection (Primary)   Continue current over the counter medication, and plenty of fluids.   Discussed [facility] quarantine guidelines.    2. Upper respiratory tract infection, unspecified type  -     azithromycin (Zithromax Z-Hakeem) 250 MG tablet; Take 2 tablets the first day, then 1 tablet daily for 4 days.  Dispense: 6 tablet; Refill: 0    3. Nasal congestion  -     POCT SARS-CoV-2 + Flu Antigen VIK            Return for Annual physical, fasting.

## 2024-06-20 ENCOUNTER — TELEPHONE (OUTPATIENT)
Dept: INTERNAL MEDICINE | Facility: CLINIC | Age: 44
End: 2024-06-20
Payer: COMMERCIAL

## 2024-06-20 DIAGNOSIS — B00.2 ORAL HERPES: Primary | ICD-10-CM

## 2024-06-20 RX ORDER — VALACYCLOVIR HYDROCHLORIDE 1 G/1
2000 TABLET, FILM COATED ORAL 2 TIMES DAILY
Qty: 4 TABLET | Refills: 0 | Status: SHIPPED | OUTPATIENT
Start: 2024-06-20 | End: 2024-06-21

## 2024-06-20 NOTE — TELEPHONE ENCOUNTER
Caller: Lisa Grover    Relationship: Self    Best call back number: [phone]    What medication are you requesting: VALACYCLOVIR 500MG     What are your current symptoms: TINGLING FEELS THAT SHE IS GETTING A COLD     How long have you been experiencing symptoms: JUST A DAY OR TWO     Have you had these symptoms before:    [] Yes  [x] No    Have you been treated for these symptoms before:   [] Yes  [x] No    If a prescription is needed, what is your preferred pharmacy and phone number: [facility] [place] [address] [place] [address] 6 - [phone] [facility] [phone] FX     Additional notes:THE PATIENT STATES THAT SHE HAS BEEN UNDER A LOT OF STRESS SHE WOULD LIKE TO PREVENT GETTING A FLARE UP PLEASE CALL PATIENT TO LET HER KNOW IF THAT CAN BE DONE

## 2024-06-21 NOTE — TELEPHONE ENCOUNTER
Called patient, unable to reach, and left detailed vm.    RELAY:   Valacyclovir has been sent to her pharmacy.  Thanks.

## 2025-04-10 ENCOUNTER — TELEPHONE (OUTPATIENT)
Dept: INTERNAL MEDICINE | Facility: CLINIC | Age: 45
End: 2025-04-10
Payer: COMMERCIAL

## 2025-04-10 DIAGNOSIS — R11.2 NAUSEA AND VOMITING, UNSPECIFIED VOMITING TYPE: Primary | ICD-10-CM

## 2025-04-10 NOTE — TELEPHONE ENCOUNTER
Vomited yesterday and seen blood, had heart burn, indigestion and burning of the heart, chest and esophagus.  Only OTC tried was pepto. She was not able to keep food down yesterday or the Pepto. No alcohol involved    Today she feels better, just had concerns of the blood in her vomit yesterday. She is keeping food down and Pepto down as well. She would like to know what medication she can try to ease her symptoms.     Patient scheduled tomorrow at 1230, advised if she feels better she can call and cancel appointment.    Pt understands.

## 2025-04-10 NOTE — TELEPHONE ENCOUNTER
Patient states she had been vomiting for a couple days. The last time she vomit was last night. She states she vomited up a blood clot and her esophagus is burning. She states she hasn't been able to eat. Patient is here in town today and tomorrow.

## 2025-04-11 RX ORDER — ONDANSETRON 8 MG/1
8 TABLET, ORALLY DISINTEGRATING ORAL EVERY 8 HOURS PRN
Qty: 30 TABLET | Refills: 0 | Status: SHIPPED | OUTPATIENT
Start: 2025-04-11

## 2025-04-11 NOTE — TELEPHONE ENCOUNTER
Name: AlexmelaniLisasey      Relationship: Self      Best Callback Number: [phone]       HUB PROVIDED THE RELAY MESSAGE FROM THE OFFICE      PATIENT: VOICED UNDERSTANDING AND HAS NO FURTHER QUESTIONS AT THIS TIME    ADDITIONAL INFORMATION:[facility] [id number] - [place], [place] - [address] - [phone]  - [phone] FX     DOES NOT NEED APPOINTMENT

## 2025-04-11 NOTE — TELEPHONE ENCOUNTER
Attempt to call patient, no answer    Left vm for patient to return call      PLEASE RELAY:   I can send in some Zofran for the nausea.